# Patient Record
Sex: FEMALE | Race: BLACK OR AFRICAN AMERICAN | Employment: UNEMPLOYED | URBAN - METROPOLITAN AREA
[De-identification: names, ages, dates, MRNs, and addresses within clinical notes are randomized per-mention and may not be internally consistent; named-entity substitution may affect disease eponyms.]

---

## 2017-08-22 ENCOUNTER — OFFICE VISIT (OUTPATIENT)
Dept: INTERNAL MEDICINE CLINIC | Age: 22
End: 2017-08-22

## 2017-08-22 VITALS
HEIGHT: 62 IN | DIASTOLIC BLOOD PRESSURE: 78 MMHG | OXYGEN SATURATION: 95 % | HEART RATE: 104 BPM | SYSTOLIC BLOOD PRESSURE: 110 MMHG | BODY MASS INDEX: 20.76 KG/M2 | TEMPERATURE: 98.5 F | WEIGHT: 112.8 LBS

## 2017-08-22 DIAGNOSIS — M25.40 JOINT SWELLING: ICD-10-CM

## 2017-08-22 DIAGNOSIS — Z87.39 HISTORY OF JOINT PAIN: ICD-10-CM

## 2017-08-22 DIAGNOSIS — M54.2 NECK PAIN: Primary | ICD-10-CM

## 2017-08-22 DIAGNOSIS — G89.29 CHRONIC JAW PAIN: ICD-10-CM

## 2017-08-22 DIAGNOSIS — R68.84 CHRONIC JAW PAIN: ICD-10-CM

## 2017-08-22 RX ORDER — NORETHINDRONE ACETATE AND ETHINYL ESTRADIOL 1MG-20(24)
KIT ORAL
Refills: 4 | Status: ON HOLD | COMMUNITY
Start: 2017-07-05 | End: 2021-08-02

## 2017-08-22 RX ORDER — CYCLOBENZAPRINE HCL 10 MG
10 TABLET ORAL
Qty: 15 TAB | Refills: 0 | Status: SHIPPED | OUTPATIENT
Start: 2017-08-22 | End: 2017-09-06

## 2017-08-22 RX ORDER — IBUPROFEN 200 MG
TABLET ORAL
COMMUNITY
End: 2021-03-15
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
## 2017-08-23 LAB
25(OH)D3+25(OH)D2 SERPL-MCNC: 39.8 NG/ML (ref 30–100)
ALBUMIN SERPL-MCNC: 4.8 G/DL (ref 3.5–5.5)
ALBUMIN/GLOB SERPL: 1.7 {RATIO} (ref 1.2–2.2)
ALP SERPL-CCNC: 49 IU/L (ref 39–117)
ALT SERPL-CCNC: 10 IU/L (ref 0–32)
ANA SER QL: POSITIVE
AST SERPL-CCNC: 11 IU/L (ref 0–40)
BILIRUB SERPL-MCNC: 0.2 MG/DL (ref 0–1.2)
BUN SERPL-MCNC: 10 MG/DL (ref 6–20)
BUN/CREAT SERPL: 17 (ref 9–23)
CALCIUM SERPL-MCNC: 10.2 MG/DL (ref 8.7–10.2)
CCP IGA+IGG SERPL IA-ACNC: 7 UNITS (ref 0–19)
CHLORIDE SERPL-SCNC: 99 MMOL/L (ref 96–106)
CO2 SERPL-SCNC: 25 MMOL/L (ref 18–29)
CREAT SERPL-MCNC: 0.59 MG/DL (ref 0.57–1)
DSDNA AB SER-ACNC: 50 IU/ML (ref 0–9)
ENA RNP AB SER-ACNC: 0.3 AI (ref 0–0.9)
ENA SM AB SER-ACNC: <0.2 AI (ref 0–0.9)
ENA SS-A AB SER-ACNC: <0.2 AI (ref 0–0.9)
ENA SS-B AB SER-ACNC: <0.2 AI (ref 0–0.9)
ERYTHROCYTE [DISTWIDTH] IN BLOOD BY AUTOMATED COUNT: 14 % (ref 12.3–15.4)
GLOBULIN SER CALC-MCNC: 2.9 G/DL (ref 1.5–4.5)
GLUCOSE SERPL-MCNC: 75 MG/DL (ref 65–99)
HCT VFR BLD AUTO: 39 % (ref 34–46.6)
HGB BLD-MCNC: 12.5 G/DL (ref 11.1–15.9)
MCH RBC QN AUTO: 27.7 PG (ref 26.6–33)
MCHC RBC AUTO-ENTMCNC: 32.1 G/DL (ref 31.5–35.7)
MCV RBC AUTO: 86 FL (ref 79–97)
PLATELET # BLD AUTO: 332 X10E3/UL (ref 150–379)
POTASSIUM SERPL-SCNC: 4.6 MMOL/L (ref 3.5–5.2)
PROT SERPL-MCNC: 7.7 G/DL (ref 6–8.5)
RBC # BLD AUTO: 4.52 X10E6/UL (ref 3.77–5.28)
RHEUMATOID FACT SERPL-ACNC: <10 IU/ML (ref 0–13.9)
SEE BELOW, 164869: ABNORMAL
SODIUM SERPL-SCNC: 138 MMOL/L (ref 134–144)
TSH SERPL DL<=0.005 MIU/L-ACNC: 1 UIU/ML (ref 0.45–4.5)
WBC # BLD AUTO: 4.2 X10E3/UL (ref 3.4–10.8)

## 2017-08-29 ENCOUNTER — OFFICE VISIT (OUTPATIENT)
Dept: INTERNAL MEDICINE CLINIC | Age: 22
End: 2017-08-29

## 2017-08-29 VITALS
HEART RATE: 98 BPM | OXYGEN SATURATION: 98 % | WEIGHT: 115.2 LBS | TEMPERATURE: 99 F | HEIGHT: 62 IN | BODY MASS INDEX: 21.2 KG/M2 | DIASTOLIC BLOOD PRESSURE: 70 MMHG | RESPIRATION RATE: 14 BRPM | SYSTOLIC BLOOD PRESSURE: 118 MMHG

## 2017-08-29 DIAGNOSIS — R76.0 LUPUS ANTICOAGULANT POSITIVE: Primary | ICD-10-CM

## 2017-08-29 NOTE — MR AVS SNAPSHOT
Visit Information Date & Time Provider Department Dept. Phone Encounter #  
 8/29/2017  2:30 PM Mike Recinos MD [facility] Internal Medicine [phone] [id number] Upcoming Health Maintenance Date Due Hepatitis A Peds Age 1-18 (1 of 2 - Standard Series) 10/28/1997 DTaP/Tdap/Td series (1 - Tdap) 10/28/2003 HPV AGE 9Y-26Y (1 of 3 - Female 3 Dose Series) 10/28/2007 INFLUENZA AGE 9 TO ADULT 8/1/2017 Allergies as of 8/29/2017  Review Complete On: 8/29/2017 By: Rosalinda Loving LPN Severity Noted Reaction Type Reactions Pcn [Penicillins] High 08/22/2017    Hives Current Immunizations  Never Reviewed No immunizations on file. Not reviewed this visit You Were Diagnosed With   
  
 Codes Comments Lupus anticoagulant positive    -  Primary ICD-10-CM: R76.0 ICD-9-CM: 795.79 Vitals BP Pulse Temp Resp Height(growth percentile) Weight(growth percentile) 118/70 (BP 1 Location: Right arm, BP Patient Position: Sitting) 98 99 °F (37.2 °C) (Oral) 14 5' 1.5\" (1.562 m) 115 lb 3.2 oz (52.3 kg) LMP SpO2 BMI OB Status Smoking Status 06/24/2017 98% 21.41 kg/m2 Medically Induced Never Smoker BMI and BSA Data Body Mass Index Body Surface Area  
 21.41 kg/m 2 1.51 m 2 Preferred Pharmacy Pharmacy Name Phone [id number] [address] [facility] [phone] Your Updated Medication List  
  
   
This list is accurate as of: 8/29/17  3:31 PM.  Always use your most recent med list.  
  
  
  
  
 BLISOVI 24 FE 1 mg-20 mcg (24)/75 mg (4) Tab Generic drug:  norethindrone-e estradiol-iron TAKE 2 TABLETS BY MOUTH THE 1ST WEEK, THEN 1 TABLET BY MOUTH EVERY DAY FOR BLEEDING  
  
 cyclobenzaprine 10 mg tablet Commonly known as:  FLEXERIL Take 1 Tab by mouth nightly for 15 days. MOTRIN  mg tablet Generic drug:  ibuprofen Take  by mouth.   
  
 TYLENOL EXTRA STRENGTH PO  
 Take  by mouth. We Performed the Following REFERRAL TO RHEUMATOLOGY [VPI15 Custom] Comments:  
 Please evaluate patient for positive Lupus Ab. Referral Information Referral ID Referred By Referred To  
  
 [id number] HABIB, Lonney Phoenix, MD   
   [id number] [facility], [address] [place] [address] Road Phone: [phone] Fax: [phone] Visits Status Start Date End Date 1 New Request 8/29/17 8/29/18 If your referral has a status of pending review or denied, additional information will be sent to support the outcome of this decision. Introducing [facility]! Dear Enrrique: Thank you for requesting a [employer] account. Our records indicate that you already have an active [employer] account. You can access your account anytime at [email or web address] Did you know that you can access your hospital and ER discharge instructions at any time in [employer]? You can also review all of your test results from your hospital stay or ER visit. Additional Information If you have questions, please visit the Frequently Asked Questions section of the [employer] website at [email or web address] Remember, [employer] is NOT to be used for urgent needs. For medical emergencies, dial 911. Now available from your iPhone and Android! Please provide this summary of care documentation to your next provider. Your primary care clinician is listed as Estrella Jackman. If you have any questions after today's visit, please call [phone].

## 2017-08-29 NOTE — PROGRESS NOTES
Patient is here for lab follow up. Visit Vitals    /70 (BP 1 Location: Right arm, BP Patient Position: Sitting)    Pulse 98    Temp 99 °F (37.2 °C) (Oral)    Resp 14    Ht 5' 1.5\" (1.562 m)    Wt 115 lb 3.2 oz (52.3 kg)    SpO2 98%    BMI 21.41 kg/m2     1. Have you been to the ER, urgent care clinic since your last visit? Hospitalized since your last visit? Yes. In June [address]. Seen for jaw pain. Pt states that jaw locked up and could not open mouth. 2. Have you seen or consulted any other health care providers outside of the [address] [place] [address] since your last visit? Include any pap smears or colon screening.  No

## 2017-08-29 NOTE — PROGRESS NOTES
Written by Vicki Birch, as dictated by Dr. Cristo Her MD.    Sammie Blandon is a 22 y.o. female. HPI  The patient comes in today to discuss labs, drawn on 08/22. Autoimmune tests came back positive for lupus. RA came back negative. She has been feeling tired lately but no rash or joint swelling. Current Outpatient Prescriptions on File Prior to Visit   Medication Sig Dispense Refill    BLISOVI 24 FE 1 mg-20 mcg (24)/75 mg (4) tab TAKE 2 TABLETS BY MOUTH THE 1ST WEEK, THEN 1 TABLET BY MOUTH EVERY DAY FOR BLEEDING  4    cyclobenzaprine (FLEXERIL) 10 mg tablet Take 1 Tab by mouth nightly for 15 days. 15 Tab 0    ACETAMINOPHEN (TYLENOL EXTRA STRENGTH PO) Take  by mouth.  ibuprofen (MOTRIN IB) 200 mg tablet Take  by mouth. No current facility-administered medications on file prior to visit. Allergies   Allergen Reactions    Pcn [Penicillins] Hives       Past Medical History:   Diagnosis Date    Chronic pain     Headache     Migraines     Patella-femoral syndrome        History reviewed. No pertinent surgical history. History reviewed. No pertinent family history. Social History     Social History    Marital status: SINGLE     Spouse name: N/A    Number of children: N/A    Years of education: N/A     Occupational History    Not on file.      Social History Main Topics    Smoking status: Never Smoker    Smokeless tobacco: Never Used    Alcohol use No    Drug use: No    Sexual activity: No     Other Topics Concern    Not on file     Social History Narrative       Office Visit on 08/22/2017   Component Date Value Ref Range Status    Rheumatoid factor 08/22/2017 <10.0  0.0 - 13.9 IU/mL Final    CCP Antibodies IgG/IgA 08/22/2017 7  0 - 19 units Final    Comment:                           Negative               <20                            Weak positive      20 - 39                            Moderate positive  40 - 59 Strong positive        >59      Glucose 08/22/2017 75  65 - 99 mg/dL Final    BUN 08/22/2017 10  6 - 20 mg/dL Final    Creatinine 08/22/2017 0.59  0.57 - 1.00 mg/dL Final    GFR est non-AA 08/22/2017 132  >59 mL/min/1.73 Final    GFR est AA 08/22/2017 153  >59 mL/min/1.73 Final    BUN/Creatinine ratio 08/22/2017 17  9 - 23 Final    Sodium 08/22/2017 138  134 - 144 mmol/L Final    Potassium 08/22/2017 4.6  3.5 - 5.2 mmol/L Final    Chloride 08/22/2017 99  96 - 106 mmol/L Final    CO2 08/22/2017 25  18 - 29 mmol/L Final    Calcium 08/22/2017 10.2  8.7 - 10.2 mg/dL Final    Protein, total 08/22/2017 7.7  6.0 - 8.5 g/dL Final    Albumin 08/22/2017 4.8  3.5 - 5.5 g/dL Final    GLOBULIN, TOTAL 08/22/2017 2.9  1.5 - 4.5 g/dL Final    A-G Ratio 08/22/2017 1.7  1.2 - 2.2 Final    Bilirubin, total 08/22/2017 0.2  0.0 - 1.2 mg/dL Final    Alk. phosphatase 08/22/2017 49  39 - 117 IU/L Final    AST (SGOT) 08/22/2017 11  0 - 40 IU/L Final    ALT (SGPT) 08/22/2017 10  0 - 32 IU/L Final    TSH 08/22/2017 0.996  0.450 - 4.500 uIU/mL Final    WBC 08/22/2017 4.2  3.4 - 10.8 x10E3/uL Final    RBC 08/22/2017 4.52  3.77 - 5.28 x10E6/uL Final    HGB 08/22/2017 12.5  11.1 - 15.9 g/dL Final    HCT 08/22/2017 39.0  34.0 - 46.6 % Final    MCV 08/22/2017 86  79 - 97 fL Final    MCH 08/22/2017 27.7  26.6 - 33.0 pg Final    MCHC 08/22/2017 32.1  31.5 - 35.7 g/dL Final    RDW 08/22/2017 14.0  12.3 - 15.4 % Final    PLATELET 67/39/7099 319  150 - 379 x10E3/uL Final    VITAMIN D, 25-HYDROXY 08/22/2017 39.8  30.0 - 100.0 ng/mL Final    Comment: Vitamin D deficiency has been defined by the [facility] practice guideline as a  level of serum 25-OH vitamin D less than 20 ng/mL (1,2). The Endocrine Society went on to further define vitamin D  insufficiency as a level between 21 and 29 ng/mL (2). 1. IOM ([place] of [employer]). 2010.  Dietary reference     intakes for calcium and D. [facility]: The     [employer]. 2. Mary MF, Libra NC, Demetrio GARDNER, et al.     Evaluation, treatment, and prevention of vitamin D     deficiency: an Endocrine Society clinical practice     guideline. JCEM. 2011; 96(4):1911-30.       Antinuclear Antibodies Direct 2017 Positive* Negative Final    Anti-DNA (DS) Ab, QT 2017 50* 0 - 9 IU/mL Final    Comment:                                    Negative      <5                                     Equivocal  5 - 9                                     Positive      >9      RNP Abs 2017 0.3  0.0 - 0.9 AI Final    Smith Abs 2017 <0.2  0.0 - 0.9 AI Final    Sjogren's Anti-SS-A 2017 <0.2  0.0 - 0.9 AI Final    Sjogren's Anti-SS-B 2017 <0.2  0.0 - 0.9 AI Final    See below 2017 Comment   Final    Comment: Autoantibody                       Disease Association  ------------------------------------------------------------                          Condition                  Frequency  ---------------------   ------------------------   ---------  Antinuclear Antibody,    SLE, mixed connective  Direct (ARAVIND-D)           tissue diseases  ---------------------   ------------------------   ---------  dsDNA                    SLE                        40 - 60%  ---------------------   ------------------------   ---------  Chromatin                Drug induced SLE                90%                           SLE                        48 - 97%  ---------------------   ------------------------   ---------  SSA (Ro)                 SLE                        25 - 35%                           Sjogren's Syndrome         40 - 70%                            Lupus                 100%  ---------------------   ------------------------   ---------  SSB (La)                 SLE                                                        10%                           Sjogren's Syndrome 30%  ---------------------   -----------------------    ---------  Sm (anti-Smith)          SLE                        15 - 30%  ---------------------   -----------------------    ---------  RNP                      Mixed Connective Tissue                           Disease                         95%  (U1 nRNP,                SLE                        30 - 50%  anti-ribonucleoprotein)  Polymyositis and/or                           Dermatomyositis                 20%  ---------------------   ------------------------   ---------  Scl-70 (antiDNA          Scleroderma (diffuse)      20 - 35%  topoisomerase)           Crest                           13%  ---------------------   ------------------------   ---------  Miley-1                     Polymyositis and/or                           Dermatomyositis            20 - 40%  ---------------------   ------------------------   ---------  Centromere B             Scleroderma -                            Crest                           variant                         80%         Review of Systems   Constitutional: Negative for malaise/fatigue. HENT: Negative for congestion. Respiratory: Negative for cough and shortness of breath. Musculoskeletal: Positive for joint pain. Negative for myalgias. Neurological: Negative for weakness. Visit Vitals    /70 (BP 1 Location: Right arm, BP Patient Position: Sitting)    Pulse 98    Temp 99 °F (37.2 °C) (Oral)    Resp 14    Ht 5' 1.5\" (1.562 m)    Wt 115 lb 3.2 oz (52.3 kg)    LMP 06/24/2017    SpO2 98%    BMI 21.41 kg/m2       Physical Exam   Constitutional: She is oriented to person, place, and time. She appears well-developed and well-nourished. No distress. HENT:   Right Ear: External ear normal.   Left Ear: External ear normal.   Eyes: Conjunctivae and EOM are normal. Right eye exhibits no discharge. Left eye exhibits no discharge. Neck: Normal range of motion. Neck supple.    Cardiovascular: Normal rate and regular rhythm. Pulmonary/Chest: Effort normal and breath sounds normal. She has no wheezes. Abdominal: Soft. Bowel sounds are normal. There is no tenderness. Lymphadenopathy:     She has no cervical adenopathy. Neurological: She is alert and oriented to person, place, and time. Skin: She is not diaphoretic. Psychiatric: She has a normal mood and affect. Her behavior is normal.   Nursing note and vitals reviewed. ASSESSMENT and PLAN    ICD-10-CM ICD-9-CM    1. Lupus anticoagulant positive R76.0 795.79 REFERRAL TO RHEUMATOLOGY. Labs results reviewed with her. Referral to rheumatology placed. This plan was reviewed with the patient and patient agrees. All questions were answered. This scribe documentation was reviewed by me and accurately reflects the examination and decisions made by me. This note will not be viewable in [address]

## 2019-02-26 ENCOUNTER — HOSPITAL ENCOUNTER (OUTPATIENT)
Dept: GENERAL RADIOLOGY | Age: 24
Discharge: HOME | End: 2019-02-26
Payer: COMMERCIAL

## 2019-02-26 DIAGNOSIS — M32.9 SYSTEMIC LUPUS ERYTHEMATOSUS (HCC): ICD-10-CM

## 2019-02-26 DIAGNOSIS — Z82.61 FAMILY HISTORY OF ARTHRITIS: ICD-10-CM

## 2019-02-26 PROCEDURE — 73030 X-RAY EXAM OF SHOULDER: CPT

## 2019-02-26 PROCEDURE — 73630 X-RAY EXAM OF FOOT: CPT

## 2019-02-26 PROCEDURE — 73130 X-RAY EXAM OF HAND: CPT

## 2019-02-26 PROCEDURE — 73610 X-RAY EXAM OF ANKLE: CPT

## 2021-01-07 ENCOUNTER — OFFICE VISIT (OUTPATIENT)
Dept: PRIMARY CARE CLINIC | Age: 26
End: 2021-01-07
Payer: MEDICAID

## 2021-01-07 VITALS
HEART RATE: 93 BPM | RESPIRATION RATE: 16 BRPM | TEMPERATURE: 97.3 F | DIASTOLIC BLOOD PRESSURE: 77 MMHG | OXYGEN SATURATION: 95 % | BODY MASS INDEX: 37.61 KG/M2 | HEIGHT: 61 IN | SYSTOLIC BLOOD PRESSURE: 124 MMHG | WEIGHT: 199.2 LBS

## 2021-01-07 DIAGNOSIS — F32.4 MAJOR DEPRESSIVE DISORDER IN PARTIAL REMISSION, UNSPECIFIED WHETHER RECURRENT (HCC): ICD-10-CM

## 2021-01-07 DIAGNOSIS — Z76.89 ESTABLISHING CARE WITH NEW DOCTOR, ENCOUNTER FOR: ICD-10-CM

## 2021-01-07 DIAGNOSIS — F41.1 GAD (GENERALIZED ANXIETY DISORDER): ICD-10-CM

## 2021-01-07 DIAGNOSIS — R63.5 WEIGHT GAIN: ICD-10-CM

## 2021-01-07 DIAGNOSIS — L08.9 TOE INFECTION: ICD-10-CM

## 2021-01-07 DIAGNOSIS — Z28.21 REFUSED INFLUENZA VACCINE: ICD-10-CM

## 2021-01-07 DIAGNOSIS — M32.9 LUPUS (HCC): Primary | ICD-10-CM

## 2021-01-07 PROBLEM — Z86.59 HISTORY OF EATING DISORDER: Status: ACTIVE | Noted: 2021-01-07

## 2021-01-07 PROBLEM — F32.A DEPRESSION: Status: ACTIVE | Noted: 2021-01-07

## 2021-01-07 PROCEDURE — 99203 OFFICE O/P NEW LOW 30 MIN: CPT | Performed by: FAMILY MEDICINE

## 2021-01-07 RX ORDER — DICYCLOMINE HYDROCHLORIDE 10 MG/1
10 CAPSULE ORAL
COMMUNITY
Start: 2020-11-20 | End: 2021-08-04

## 2021-01-07 RX ORDER — LORAZEPAM 0.5 MG/1
TABLET ORAL
Status: ON HOLD | COMMUNITY
Start: 2020-11-20 | End: 2021-08-02

## 2021-01-07 RX ORDER — MIRTAZAPINE 15 MG/1
15 TABLET, FILM COATED ORAL
COMMUNITY
Start: 2020-12-27 | End: 2021-07-13 | Stop reason: SDUPTHER

## 2021-01-07 RX ORDER — PREDNISONE 5 MG/1
TABLET ORAL SEE ADMIN INSTRUCTIONS
COMMUNITY
End: 2021-03-15 | Stop reason: ALTCHOICE

## 2021-01-07 RX ORDER — FLUOXETINE HYDROCHLORIDE 40 MG/1
40 CAPSULE ORAL DAILY
COMMUNITY
Start: 2020-12-27 | End: 2021-07-13 | Stop reason: SDUPTHER

## 2021-01-07 NOTE — PROGRESS NOTES
Chief Complaint   Patient presents with   [facility] Establish Care    Neck Pain     base of neck that radiates to right jaw [facility]    Weight Gain    Dental Problem     teeth falling out since 2013      Pap up to date    3 most recent PHQ Screens 1/7/2021   Little interest or pleasure in doing things Several days   Feeling down, depressed, irritable, or hopeless Several days   Total Score PHQ 2 2     Abuse Screening Questionnaire 1/7/2021   Do you ever feel afraid of your partner? N   Are you in a relationship with someone who physically or mentally threatens you? N   Is it safe for you to go home? Y     Visit Vitals  /77 (BP 1 Location: Left arm)   Pulse 93   Temp 97.3 °F (36.3 °C) (Oral)   Resp 16   Ht 5' 1\" (1.549 m)   Wt 199 lb 3.2 oz (90.4 kg)   SpO2 95%   BMI 37.64 kg/m²     1. Have you been to the ER, urgent care clinic since your last visit? Hospitalized since your last visit?no    2. Have you seen or consulted any other health care providers outside of the [address] [place] [address] since your last visit? Include any pap smears or colon screening.  Rheumatology

## 2021-01-07 NOTE — PATIENT INSTRUCTIONS
[address] [facility] [employer]  [address], [address] [facility] Denise Dozier, [address]  [phone]    Lupus: Care Instructions  Overview  Lupus (systemic lupus erythematosus) is a long-term disease that can cause inflammation, pain, and tissue damage in your body. It is an autoimmune disease. This means the immune system attacks its own tissues. Lupus may cause problems with your skin, kidneys, heart, lungs, nerves, or blood cells. There are other types of lupus, but systemic lupus erythematosus is the most common and most serious type. When you have lupus symptoms, you are having flares or relapses. When your symptoms get better, you are in remission. Lupus may get worse very quickly. There is no way to tell when a flare will happen or how bad it will be. When you have a lupus flare, you may have new symptoms as well as symptoms you have had in the past.  Learn your body's signs of a flare, such as joint pain, a rash, a fever, or being more tired. When you see any of these signs, take steps to control your symptoms. Follow-up care is a key part of your treatment and safety. Be sure to make and go to all appointments, and call your doctor if you are having problems. It's also a good idea to know your test results and keep a list of the medicines you take. How can you care for yourself at home? Reduce stress and tiredness  · Keep your daily schedule as simple as possible. · Keep your list of things to do as short as you can. · Exercise regularly. A daily walk or swim, for example, can lower stress, clear your head, improve your mood, and help fight tiredness. · Use meditation, yoga, or guided imagery to relax. · Get plenty of rest. Some people with lupus need up to 12 hours of sleep every night. · Pace yourself. Do not do too many activities. · Ask others for help. Do not try to do everything yourself. · Take short breaks from your usual activities.  Think about cutting down on work hours when your symptoms are severe. · If you think that depression or anxiety is making you feel more tired, talk to your doctor, a mental health professional, or both. Take care of your skin  · Ask your doctor about the use of corticosteroid creams for skin symptoms. · If you are bothered by the way a lupus rash looks on your face or if you have scars from lupus, you can try makeup, such as Covermark, to cover the rash or scars. · Stay out of the sun, especially when the sun's rays are the strongest, usually between 10 a.m. and 4 p.m. If you must be in the sun, cover your arms and legs, and wear a hat. Make sure to use a broad-spectrum sunscreen that has a sun protection factor (SPF) of 50 or higher. Put more sunscreen on after swimming, sweating, or toweling off. Practice good self-care  · Learn more about lupus and how to take care of yourself. · Take your medicines exactly as prescribed. Call your doctor if you have any problems with your medicine. · Do not smoke. If you need help quitting, talk to your doctor about stop-smoking programs and medicines. These can increase your chances of quitting for good. · Eat a healthy, balanced diet. A balanced diet includes whole grains, dairy, fruits and vegetables, and protein. Eat a variety of foods from each of those groups so you get all the nutrients you need. · Avoid other people who are sick with colds or the flu. These illnesses can cause lupus flares. Talk to your doctor about flu shots and pneumococcal vaccinations. If you do get sick or think you are getting an infection, talk with your doctor so you can treat your symptoms right away. · Brush and floss your teeth each day. See your dentist two times a year. · Get regular eye exams. · Build a support system of family, friends, and health professionals. When should you call for help? Call 911 anytime you think you may need emergency care.  For example, call if:    · You have symptoms of a heart attack. These may include:  ? Chest pain or pressure, or a strange feeling in the chest.  ? Sweating. ? Shortness of breath. ? Nausea or vomiting. ? Pain, pressure, or a strange feeling in the back, neck, jaw, or upper belly or in one or both shoulders or arms. ? Lightheadedness or sudden weakness. ? A fast or irregular heartbeat. After you call 911, the  may tell you to chew 1 adult-strength or 2 to 4 low-dose aspirin. Wait for an ambulance. Do not try to drive yourself. Call your doctor now or seek immediate medical care if:    · You are short of breath.     · You have blood in your urine or are urinating less often and in smaller amounts than usual.     · You have a fever.     · You feel depressed or notice any changes in your behavior or thinking.     · You are dizzy or have muscle weakness.     · You have swelling of the lower legs or feet. Watch closely for changes in your health, and be sure to contact your doctor if:    · Your symptoms get worse or you develop any new symptoms. These may include aching or swollen joints, increased fatigue, loss of appetite, hair loss, skin rashes, or new sores in your mouth or nose. Where can you learn more? Go to http://www.gray.com/  Enter H871 in the search box to learn more about \"Lupus: Care Instructions. \"  Current as of: December 9, 2019               Content Version: 12.6  © 9966-6450 [employer]. Care instructions adapted under license by [employer] (which disclaims liability or warranty for this information).  If you have questions about a medical condition or this instruction, always ask your healthcare professional. [place] [address] any warranty or liability for your use of this information.

## 2021-01-07 NOTE — PROGRESS NOTES
HPI     Chief Complaint   Patient presents with   [facility]    Neck Pain     base of neck that radiates to right jaw [facility]    Weight Gain    Dental Problem     teeth falling out since 2013     She is a 26 y.o. female who presents to [facility]. Establishing Care  - Chronic medical problems: lupus, depression, anxiety, possible endometriosis, headaches, floating knee cap syndrome  - Family history: Dad has floating knee cap syndrome, Mom and Brother both have chronic pain, brother has spondyloarthropathy and Mom has osteoarthritis  - Surgical history: had to have foreign body removed at 13 YO (quarter)  - Social history (sexually active, occupation, smoker, etoh use, etc): lives with Mom and brother, not currently working, not sexually active, never been a smoker, social alcohol use    Has multiple concerns today. Was diagnosed with lupus a few years ago. Followed by Dr. Cassius Mattson with Rheum. States she briefly tried Plaquenil but states she could not tolerate it. Has been on systemic Prednisone a few times but never long  States she just started steroids again 2 days ago for neck and knee pain. Is planning on seeing Ortho for these problems in a few days. Notes she has received multiple joint injections. She states her teeth are starting to fall out. She is followed by a dentist for this and they state it is due to her lupus. She has a history of oral ulcers, multiple joint effusions/ pains. She had a positive ARAVIND and DsDNA lab in 2017. States briefly after she was diagnosed she also had anemia. She is concerned that she has been misdiagnosed or has something else going on. Interested in a second opinion. She has a history of depression, anxiety and eating disorder. She was put on Remeron and Xanax to help her gain weight. She states ever since she went on the Xanax she has continued to gain weight. She needs a new referral to Psych since getting Medicaid.  States her depression/ SHALINI is well controlled. Denies SI/ HI. States she has been on birth control for possible endometriosis. States Rheum is aware she was started on the birth control. Has no history of blood clots. States she has had recurrent toe infections on her L great toe. States the skin sometimes peels. No current infection. Notes about 2 weeks ago she woke up and had a black spot under her L great toe toenail. Does not recall any trauma. States the spot has turned lighter in color over the past 2 weeks. Review of Systems  Denies fever, chills, chest pain, shortness of breath, abd pain, nausea, vomiting    Reviewed PmHx, RxHx, FmHx, SocHx, AllgHx and updated and dated in the chart. Physical Exam:  Visit Vitals  /77 (BP 1 Location: Left arm)   Pulse 93   Temp 97.3 °F (36.3 °C) (Oral)   Resp 16   Ht 5' 1\" (1.549 m)   Wt 199 lb 3.2 oz (90.4 kg)   SpO2 95%   BMI 37.64 kg/m²     Physical Exam  Vitals signs and nursing note reviewed. Constitutional:       General: She is not in acute distress. Appearance: Normal appearance. She is not ill-appearing. Cardiovascular:      Rate and Rhythm: Normal rate and regular rhythm. Heart sounds: No murmur. Pulmonary:      Effort: Pulmonary effort is normal. No respiratory distress. Breath sounds: Normal breath sounds. Skin:     Capillary Refill: Capillary refill takes less than 2 seconds. Comments: L great toe shows what appears to be hematoma under the nail bed. No tenderness to palpation. The region does not extend the entire length of the nail bed and is circular in appearance. There are no active signs of infection including erythema, warmth, or tenderness to palpation of the L great toe. Neurological:      Mental Status: She is alert. No results found for this or any previous visit (from the past 12 hour(s)). Assessment / Plan     Diagnoses and all orders for this visit:    1.  Lupus ([address] - Briefly discussed with patient it appears she meets at least 4 of the [place] criteria (3 clinical and 2 immunological criteria) at some point in time. Discussed I would be happy to refer to another Rheumatologist for second opinion or to see if she would be a candidate for other treatment options. Referred to Dr. Indio Walters. Recommend she fill out release of records for Dr. Reatha Hatchet. Will check CBC and CMP today. -     CBC W/O DIFF; Future  -     METABOLIC PANEL, COMPREHENSIVE; Future  -     REFERRAL TO RHEUMATOLOGY    2. Weight gain - Patient states she has continuously gained weight since she was placed on Xanax and Remeron for history of eating disorder. She is followed by Psych. Will check LDL, A1c and TSH today. -     LDL, DIRECT; Future  -     HEMOGLOBIN A1C WITH EAG; Future  -     TSH 3RD GENERATION; Future    3. Toe infection - No active infection. Hx of recurrent infections. Appears she has a subungual hematoma and history of it already improving/ lightening in color is consistent with this. Will refer to Podiatry to see if this can be drained or if the nail needs to be trimmed back to prevent future toe infections. Discussed if the hematoma does not appear to be continuing to improve over the next few weeks to please let me know. -     REFERRAL TO PODIATRY; Future    4. SHALINI (generalized anxiety disorder) - Referred to Psych.  -     REFERRAL TO PSYCHIATRY    5. Major depressive disorder in partial remission, unspecified whether recurrent ([address] - Referred to Psych.  -     REFERRAL TO PSYCHIATRY    6. Establishing care with new doctor, encounter for    7. Refused influenza vaccine    I have discussed the diagnosis with the patient and the intended plan as seen in the above orders. The patient has received an after-visit summary and questions were answered concerning future plans. I have discussed medication side effects and warnings with the patient as well.     Sheldon Orourke, DO

## 2021-01-18 ENCOUNTER — TELEPHONE (OUTPATIENT)
Dept: PRIMARY CARE CLINIC | Age: 26
End: 2021-01-18

## 2021-01-18 DIAGNOSIS — R73.03 PREDIABETES: Primary | ICD-10-CM

## 2021-01-18 DIAGNOSIS — E78.5 HYPERLIPIDEMIA, UNSPECIFIED HYPERLIPIDEMIA TYPE: ICD-10-CM

## 2021-01-18 NOTE — TELEPHONE ENCOUNTER
Answered questions about nutrition. She is interested in referral to Nutritionist. Will provide referral. Instructed to call back of insurance card to see who is in network.

## 2021-02-18 ENCOUNTER — VIRTUAL VISIT (OUTPATIENT)
Dept: PRIMARY CARE CLINIC | Age: 26
End: 2021-02-18
Payer: COMMERCIAL

## 2021-02-18 DIAGNOSIS — L30.0 NUMMULAR ECZEMA: Primary | ICD-10-CM

## 2021-02-18 PROCEDURE — 99213 OFFICE O/P EST LOW 20 MIN: CPT | Performed by: FAMILY MEDICINE

## 2021-02-18 RX ORDER — HYDROCORTISONE 25 MG/G
CREAM TOPICAL 2 TIMES DAILY
Qty: 30 G | Refills: 0 | Status: SHIPPED | OUTPATIENT
Start: 2021-02-18 | End: 2021-03-15 | Stop reason: ALTCHOICE

## 2021-02-18 NOTE — PATIENT INSTRUCTIONS
Atopic Dermatitis: Care Instructions Your Care Instructions Atopic dermatitis (also called eczema) is a skin problem that causes intense itching and a red, raised rash. In severe cases, the rash develops clear fluidfilled blisters. The rash is not contagious. People with this condition seem to have very sensitive immune systems that are likely to react to things that cause allergies. The immune system is the body's way of fighting infection. There is no cure for atopic dermatitis, but you may be able to control it with care at home. Follow-up care is a key part of your treatment and safety. Be sure to make and go to all appointments, and call your doctor if you are having problems. It's also a good idea to know your test results and keep a list of the medicines you take. How can you care for yourself at home? · Use moisturizer at least twice a day. · If your doctor prescribes a cream, use it as directed. If your doctor prescribes other medicine, take it exactly as directed. · Wash the affected area with water only. Soap can make dryness and itching worse. Pat dry. · Apply a moisturizer after bathing. Use a cream such as Lubriderm, Moisturel, or Cetaphil that does not irritate the skin or cause a rash. Apply the cream while your skin is still damp after lightly drying with a towel. · Use cold, wet cloths to reduce itching. · Keep cool, and stay out of the sun. · If itching affects your normal activities, an over-the-counter antihistamine, such as diphenhydramine (Benadryl) or loratadine (Claritin) may help. Read and follow all instructions on the label. When should you call for help? Call your doctor now or seek immediate medical care if: 
  · Your rash gets worse and you have a fever.  
  · You have new blisters or bruises, or the rash spreads and looks like a sunburn.  
  · You have signs of infection, such as: 
? Increased pain, swelling, warmth, or redness. ? Red streaks leading from the rash. ? Pus draining from the rash. ? A fever.  
  · You have crusting or oozing sores.  
  · You have joint aches or body aches along with your rash. Watch closely for changes in your health, and be sure to contact your doctor if: 
  · Your rash does not clear up after 2 to 3 weeks of home treatment.  
  · Itching interferes with your sleep or daily activities. Where can you learn more? Go to http://www.gray.com/ Enter P633 in the search box to learn more about \"Atopic Dermatitis: Care Instructions. \" Current as of: July 2, 2020               Content Version: 12.6 © 3778-5782 [employer]. Care instructions adapted under license by [employer] (which disclaims liability or warranty for this information).  If you have questions about a medical condition or this instruction, always ask your healthcare professional. [address] any warranty or liability for your use of this information.

## 2021-02-18 NOTE — PROGRESS NOTES
Katty Cintron [address] [place] [address]  89 y.o. female  1995  [address]  [phone]   [facility]   Telemedicine Progress Note  Jordan Wong DO       Encounter Date and Time: February 18, 2021 at 11:38 AM    Consent: Shubham Garcia, who was seen by synchronous (real-time) audio-video technology, and/or her healthcare decision maker, is aware that this patient-initiated, Telehealth encounter on 2/18/2021 is a billable service, with coverage as determined by her insurance carrier. She is aware that she may receive a bill and has provided verbal consent to proceed: Yes. No chief complaint on file. History of Present Illness   Shubham Garcia is a 26 y.o. female was evaluated by synchronous (real-time) audio-video technology from home, through a secure patient portal [employer]. PMH significant for lupus, SHALINI, depression. States she has had a skin rash for a few weeks on her L posterior aspect of wrist. States she does not have the rash anywhere else. States she has tried multiple ointments and creams but has not noticed significant improvement. Tried a family members steroid cream for 1 week that they had at home but notes it was prescribed for something on the face and was at least 3years old. The rash is circular in size, dry and itchy. States she does not wear a wrist watch or jewelery, has not tried new detergents or lotions. Review of Systems   Review of Systems   Constitutional: Negative for chills and fever. Skin: Positive for itching and rash.      Vitals/Objective:     General: alert, cooperative, no distress   Mental  status: mental status: alert, oriented to person, place, and time, normal mood, behavior, speech, dress, motor activity, and thought processes   Resp: resp: normal effort and no respiratory distress   Neuro: neuro: no gross deficits   Skin: 1.5x1.5 cm erythematous dry circular patch on extensor surface of L wrist   Due to this being a TeleHealth evaluation, many elements of the physical examination are unable to be assessed. Assessment and Plan:   Time-based coding, delete if not needed: I spent at least 10 minutes with this established patient, and >50% of the time was spent counseling and/or coordinating care regarding nummular eczema    1. Nummular eczema  Discussed conservative measures including luke warm baths, applying moisturizer when getting out with a lotion free of dyes and fragrances and contains ceramides. Sounds like steroid cream she tried may have been . Will try 2.5% Hydrocortisone BID for 1 week to see how she does. Discussed risks of steroid ointment including skin discoloration and thinning skin and should not be used on face. Instructed to send [employer] message if it is worsening on steroids or not improving. She is agreeable to plan. - hydrocortisone (HYTONE) 2.5 % topical cream; Apply  to affected area two (2) times a day. use thin layer  Dispense: 30 g; Refill: 0    Time spent in direct conversation with the patient to include medical condition(s) discussed, assessment and treatment plan: 10 min    We discussed the expected course, resolution and complications of the diagnosis(es) in detail. Medication risks, benefits, costs, interactions, and alternatives were discussed as indicated. I advised her to contact the office if her condition worsens, changes or fails to improve as anticipated. She expressed understanding with the diagnosis(es) and plan. Patient understands that this encounter was a temporary measure, and the importance of further follow up and examination was emphasized. Patient verbalized understanding. Patient informed to follow up: 1 week. Electronically Signed: Zheng Cherry DO    [facility],ws is a 26 y.o. female who was evaluated by an audio-video encounter for concerns as above. Patient identification was verified prior to start of the visit.  A caregiver was present when appropriate. Due to this being a TeleHealth encounter (During AROOM-96 public health emergency), evaluation of the following organ systems was limited: Vitals/Constitutional/EENT/Resp/CV/GI//MS/Neuro/Skin/Heme-Lymph-Imm. Pursuant to the emergency declaration under the [address] [place] [address] waiver authority and the [employer] Act, this Virtual Visit was conducted, with patient's (and/or legal guardian's) consent, to reduce the patient's risk of exposure to COVID-19 and provide necessary medical care. Services were provided through a synchronous discussion virtually to substitute for in-person clinic visit. I was in the office. The patient was at home. History   Patients past medical, surgical and family histories were reviewed and updated.       Past Medical History:   Diagnosis Date    Chronic pain     Endometriosis     Headache     Lupus ([facility] [address] 2017    Migraines     Patella-femoral syndrome      Past Surgical History:   Procedure Laterality Date    HX HEENT  2001    swallowed foreign body nickel     Family History   Problem Relation Age of Onset    Hypertension Mother    [de-identified] Arthritis-osteo Mother      Social History     Socioeconomic History    Marital status: SINGLE     Spouse name: Not on file    Number of children: Not on file    Years of education: Not on file    Highest education level: Not on file   Occupational History    Not on file   Social Needs    Financial resource strain: Not on file    Food insecurity     Worry: Not on file     Inability: Not on file    Transportation needs     Medical: Not on file     Non-medical: Not on file   Tobacco Use    Smoking status: Never Smoker    Smokeless tobacco: Never Used   Substance and Sexual Activity    Alcohol use: Yes     Comment: social    Drug use: Yes     Types: Marijuana    Sexual activity: Never   Lifestyle    Physical activity     Days per week: Not on file     Minutes per session: Not on file    Stress: Not on file   Relationships    Social connections     Talks on phone: Not on file     Gets together: Not on file     Attends Orthodoxy service: Not on file     Active member of club or organization: Not on file     Attends meetings of clubs or organizations: Not on file     Relationship status: Not on file    Intimate partner violence     Fear of current or ex partner: Not on file     Emotionally abused: Not on file     Physically abused: Not on file     Forced sexual activity: Not on file   Other Topics Concern    Not on file   Social History Narrative    Not on file     Patient Active Problem List   Diagnosis Code    Lupus ([facility] [address] M32.9    SHALINI (generalized anxiety disorder) F41.1    Depression F32.9    History of eating disorder Z86.59          Current Medications/Allergies   Medications and Allergies reviewed:    Current Outpatient Medications   Medication Sig Dispense Refill    hydrocortisone (HYTONE) 2.5 % topical cream Apply  to affected area two (2) times a day. use thin layer 30 g 0    dicyclomine (BENTYL) 10 mg capsule TAKE TWO CAPSULES BY MOUTH TWICE A DAY      FLUoxetine (PROzac) 40 mg capsule       LORazepam (ATIVAN) 0.5 mg tablet TAKE ONE TABLET BY MOUTH TWICE A DAY AS NEEDED FOR ANXIETY      mirtazapine (REMERON) 15 mg tablet       predniSONE (STERAPRED) 5 mg dose pack Take  by mouth See Admin Instructions. See administration instruction per 5mg dose pack      BLISOVI 24 FE 1 mg-20 mcg (24)/75 mg (4) tab TAKE 2 TABLETS BY MOUTH THE 1ST WEEK, THEN 1 TABLET BY MOUTH EVERY DAY FOR BLEEDING  4    ACETAMINOPHEN (TYLENOL EXTRA STRENGTH PO) Take  by mouth.  ibuprofen (MOTRIN IB) 200 mg tablet Take  by mouth.        Allergies   Allergen Reactions    Pcn [Penicillins] Hives

## 2021-03-05 ENCOUNTER — TELEPHONE (OUTPATIENT)
Dept: RHEUMATOLOGY | Age: 26
End: 2021-03-05

## 2021-03-15 ENCOUNTER — OFFICE VISIT (OUTPATIENT)
Dept: PRIMARY CARE CLINIC | Age: 26
End: 2021-03-15
Payer: MEDICARE

## 2021-03-15 VITALS
TEMPERATURE: 96.6 F | HEART RATE: 90 BPM | DIASTOLIC BLOOD PRESSURE: 78 MMHG | BODY MASS INDEX: 38.1 KG/M2 | HEIGHT: 61 IN | WEIGHT: 201.8 LBS | OXYGEN SATURATION: 100 % | SYSTOLIC BLOOD PRESSURE: 121 MMHG | RESPIRATION RATE: 18 BRPM

## 2021-03-15 DIAGNOSIS — M54.2 NECK PAIN: ICD-10-CM

## 2021-03-15 DIAGNOSIS — R42 DIZZINESS: ICD-10-CM

## 2021-03-15 DIAGNOSIS — K08.9 POOR DENTITION: ICD-10-CM

## 2021-03-15 DIAGNOSIS — Z79.1 NSAID LONG-TERM USE: ICD-10-CM

## 2021-03-15 DIAGNOSIS — R20.0 RIGHT FACIAL NUMBNESS: Primary | ICD-10-CM

## 2021-03-15 PROCEDURE — 99214 OFFICE O/P EST MOD 30 MIN: CPT | Performed by: FAMILY MEDICINE

## 2021-03-15 RX ORDER — LIDOCAINE 50 MG/G
PATCH TOPICAL
Qty: 10 EACH | Refills: 0 | Status: ON HOLD | OUTPATIENT
Start: 2021-03-15 | End: 2021-08-02

## 2021-03-15 RX ORDER — NAPROXEN 500 MG/1
500 TABLET ORAL 2 TIMES DAILY WITH MEALS
Qty: 14 TAB | Refills: 0 | Status: ON HOLD | OUTPATIENT
Start: 2021-03-15 | End: 2021-08-02

## 2021-03-15 NOTE — PROGRESS NOTES
Chief Complaint   Patient presents with    Pain (Chronic)     f/u       1. Have you been to the ER, urgent care clinic since your last visit? Hospitalized since your last visit? No    2. Have you seen or consulted any other health care providers outside of the [address] [place] [address] since your last visit? Include any pap smears or colon screening. [facility] was to go to [facility] but feels she can't do it due to pain.

## 2021-03-15 NOTE — PATIENT INSTRUCTIONS
Dizziness: Care Instructions  Your Care Instructions  Dizziness is the feeling of unsteadiness or fuzziness in your head. It is different than having vertigo, which is a feeling that the room is spinning or that you are moving or falling. It is also different from lightheadedness, which is the feeling that you are about to faint.  It can be hard to know what causes dizziness. Some people feel dizzy when they have migraine headaches. Sometimes bouts of flu can make you feel dizzy. Some medical conditions, such as heart problems or high blood pressure, can make you feel dizzy. Many medicines can cause dizziness, including medicines for high blood pressure, pain, or anxiety.  If a medicine causes your symptoms, your doctor may recommend that you stop or change the medicine. If it is a problem with your heart, you may need medicine to help your heart work better. If there is no clear reason for your symptoms, your doctor may suggest watching and waiting for a while to see if the dizziness goes away on its own.  Follow-up care is a key part of your treatment and safety. Be sure to make and go to all appointments, and call your doctor if you are having problems. It's also a good idea to know your test results and keep a list of the medicines you take.  How can you care for yourself at home?  · If your doctor recommends or prescribes medicine, take it exactly as directed. Call your doctor if you think you are having a problem with your medicine.  · Do not drive while you feel dizzy.  · Try to prevent falls. Steps you can take include:  ? Using nonskid mats, adding grab bars near the tub, and using night-lights.  ? Clearing your home so that walkways are free of anything you might trip on.  ? Letting family and friends know that you have been feeling dizzy. This will help them know how to help you.  When should you call for help?   Call 911 anytime you think you may need emergency care. For example, call if:    · You  passed out (lost consciousness).     · You have dizziness along with symptoms of a heart attack. These may include:  ? Chest pain or pressure, or a strange feeling in the chest.  ? Sweating. ? Shortness of breath. ? Nausea or vomiting. ? Pain, pressure, or a strange feeling in the back, neck, jaw, or upper belly or in one or both shoulders or arms. ? Lightheadedness or sudden weakness. ? A fast or irregular heartbeat.     · You have symptoms of a stroke. These may include:  ? Sudden numbness, tingling, weakness, or loss of movement in your face, arm, or leg, especially on only one side of your body. ? Sudden vision changes. ? Sudden trouble speaking. ? Sudden confusion or trouble understanding simple statements. ? Sudden problems with walking or balance. ? A sudden, severe headache that is different from past headaches. Call your doctor now or seek immediate medical care if:    · You feel dizzy and have a fever, headache, or ringing in your ears.     · You have new or increased nausea and vomiting.     · Your dizziness does not go away or comes back. Watch closely for changes in your health, and be sure to contact your doctor if:    · You do not get better as expected. Where can you learn more? Go to http://www.gray.com/  Enter Q823 in the search box to learn more about \"Dizziness: Care Instructions. \"  Current as of: June 26, 2019               Content Version: 12.6  © 3665-0611 [employer]. Care instructions adapted under license by [employer] (which disclaims liability or warranty for this information).  If you have questions about a medical condition or this instruction, always ask your healthcare professional. [place] [address] any warranty or liability for your use of this information.      Benign Paroxysmal Positional Vertigo (BPPV): Care Instructions  Your Care Instructions     Benign paroxysmal positional vertigo, also called BPPV, is an inner ear problem. It causes a spinning or whirling sensation when you move your head. This sensation is called vertigo. The vertigo usually lasts for less than a minute. People often have vertigo spells for a few days or weeks. Then the vertigo goes away. But it may come back again. The vertigo may be mild, or it may be bad enough to cause unsteadiness, nausea, and vomiting. When you move, your inner ear sends messages to the brain. This helps you keep your balance. Vertigo can happen when debris builds up in the inner ear. The buildup can cause the inner ear to send the wrong message to the brain. Your doctor may move you in different positions to help your vertigo get better faster. This is called the Epley maneuver. Your doctor may also prescribe medicines or exercises to help with your symptoms. Follow-up care is a key part of your treatment and safety. Be sure to make and go to all appointments, and call your doctor if you are having problems. It's also a good idea to know your test results and keep a list of the medicines you take. How can you care for yourself at home? · If your doctor suggests that you do Wrad-Daroff exercises:  ? Sit on the edge of a bed or sofa. Quickly lie down on the side that causes the worst vertigo. Lie on your side with your ear down. ? Stay in this position for at least 30 seconds or until the vertigo goes away. ? Sit up. If this causes vertigo, wait for it to stop. ? Repeat the procedure on the other side. ? Repeat this 10 times. Do these exercises 2 times a day until the vertigo is gone. When should you call for help? Call 911 anytime you think you may need emergency care. For example, call if:    · You have symptoms of a stroke. These may include:  ? Sudden numbness, tingling, weakness, or loss of movement in your face, arm, or leg, especially on only one side of your body. ? Sudden vision changes. ? Sudden trouble speaking.   ? Sudden confusion or trouble understanding simple statements. ? Sudden problems with walking or balance. ? A sudden, severe headache that is different from past headaches. Call your doctor now or seek immediate medical care if:    · You have new or worse nausea and vomiting.     · You have new symptoms such as hearing loss or roaring in your ears. Watch closely for changes in your health, and be sure to contact your doctor if:    · You are not getting better as expected.     · Your vertigo gets worse. Where can you learn more? Go to http://www.gray.com/  Enter P372 in the search box to learn more about \"Benign Paroxysmal Positional Vertigo (BPPV): Care Instructions. \"  Current as of: April 15, 2020               Content Version: 12.6  © 4004-1712 [employer]. Care instructions adapted under license by "[employer]" (which disclaims liability or warranty for this information).  If you have questions about a medical condition or this instruction, always ask your healthcare professional. [place] [address] any warranty or liability for your use of this information.

## 2021-03-15 NOTE — PROGRESS NOTES
HPI     Chief Complaint   Patient presents with    Pain (Chronic)     f/u     She is a 26 y.o. female who presents for chronic pain. Patient has known diagnosis of lupus, chronic jaw pain from injury in 2013, poor dentition from chronic steroid use. Missed appt with Rhum last week. Rescheduled 3/23 and has surgery to have teeth pulled 3/24. States pain is currently 8/10. Has been taking Ibuprofen, Tylenol \"around the clock. \" Has used Voltaren gel and lidocaine cream. Took a leftover Lorazepam she had at home. Nothing helps. States the pain is in R neck going into arm and R jaw. She does have chronic neck pain for which she has seen Ortho and been diagnosed with cervical radiculopathy in the past. She was told to do PT before ordering MRI but she has not done PT. Denies vision changes, numbness, weakness, speech changes. Review of Systems   Constitutional: Negative for chills and fever. Eyes: Negative for visual disturbance. Neurological: Negative for facial asymmetry, speech difficulty, weakness and numbness. Reviewed PmHx, RxHx, FmHx, SocHx, AllgHx and updated and dated in the chart. Physical Exam:  Visit Vitals  /78 (BP 1 Location: Left arm, BP Patient Position: Sitting, BP Cuff Size: Large adult)   Pulse 90   Temp (!) 96.6 °F (35.9 °C) (Temporal)   Resp 18   Ht 5' 1\" (1.549 m)   Wt 201 lb 12.8 oz (91.5 kg)   SpO2 100%   BMI 38.13 kg/m²     Physical Exam  Vitals signs and nursing note reviewed. Constitutional:       General: She is not in acute distress. Appearance: Normal appearance. She is not ill-appearing. HENT:      Head: Atraumatic. Comments: No facial fullness. Poor half-way without odor or signs of abscess. Mouth/Throat:      Mouth: Mucous membranes are moist.   Eyes:      Extraocular Movements: Extraocular movements intact. Neck:      Musculoskeletal: Normal range of motion and neck supple. No neck rigidity or muscular tenderness.       Comments: Turning head to R reproduces sensation of dizziness that improves after a few minutes. Cardiovascular:      Rate and Rhythm: Normal rate and regular rhythm. Heart sounds: No murmur. Pulmonary:      Effort: Pulmonary effort is normal. No respiratory distress. Breath sounds: Normal breath sounds. Lymphadenopathy:      Cervical: No cervical adenopathy. Neurological:      General: No focal deficit present. Mental Status: She is alert. Comments: EOM intact. CN II-XII intact except she complains of decreased sensation on R side of face compared to L. Strength 5/5 in upper and lower ext. Gross sensation intact. Speech is normal. Answers questions appropriately. Follows commands. Able to get on and off exam table without difficulty. Gait is normal. + Larwence Saunas on R. Psychiatric:         Mood and Affect: Mood normal.         Behavior: Behavior normal.       No results found for this or any previous visit (from the past 12 hour(s)). Assessment / Plan     Diagnoses and all orders for this visit:    1. Right facial numbness  -     CT HEAD WO CONT; Future  -     CT MAXILLOFACIAL W CONT; Future    2. Dizziness  -     CT HEAD WO CONT; Future  -     CT MAXILLOFACIAL W CONT; Future    3. Poor dentition  -     CT MAXILLOFACIAL W CONT; Future    4. Neck pain  -     naproxen (NAPROSYN) 500 mg tablet; Take 1 Tab by mouth two (2) times daily (with meals). -     lidocaine (LIDODERM) 5 %; Apply patch to the affected area for 12 hours a day and remove for 12 hours a day. 5. NSAID long-term use  -     METABOLIC PANEL, BASIC; Future       - Unclear etiology of neuro symptoms. She declined ER eval today. - Discussed with Radiology who recommend CT head and CT face W contrast given hx of dentition/ jaw problems. These tests were ordered/ scheduled. Patient then stated she didn't know if she wanted to do imaging due to cost concerns.  Recommend she call central scheduling to inquire about costs/ payment plans. We discussed the importance of not only treating her pain but also working it up appropriately. - Inquiring about pain management today. Given Lidocaine patches, Naproxen BID dosing. Discussed to stop Ibuprofen and use Tylenol less frequently. Unsure if Prednisone would delay her dental procedure but will call tomorrow to ask. We discussed if her symptoms are worsening or she develops any other neuro changes she needs to be evaluated in the ER immediatly. I have discussed the diagnosis with the patient and the intended plan as seen in the above orders. The patient has received an after-visit summary and questions were answered concerning future plans. I have discussed medication side effects and warnings with the patient as well.     Yi Sweeney, DO

## 2021-03-16 LAB
ANION GAP SERPL CALC-SCNC: 9 MMOL/L (ref 5–15)
BUN SERPL-MCNC: 9 MG/DL (ref 6–20)
BUN/CREAT SERPL: 14 (ref 12–20)
CALCIUM SERPL-MCNC: 9.3 MG/DL (ref 8.5–10.1)
CHLORIDE SERPL-SCNC: 105 MMOL/L (ref 97–108)
CO2 SERPL-SCNC: 25 MMOL/L (ref 21–32)
CREAT SERPL-MCNC: 0.66 MG/DL (ref 0.55–1.02)
GLUCOSE SERPL-MCNC: 119 MG/DL (ref 65–100)
POTASSIUM SERPL-SCNC: 4.3 MMOL/L (ref 3.5–5.1)
SODIUM SERPL-SCNC: 139 MMOL/L (ref 136–145)

## 2021-03-22 ENCOUNTER — TELEPHONE (OUTPATIENT)
Dept: RHEUMATOLOGY | Age: 26
End: 2021-03-22

## 2021-03-23 ENCOUNTER — OFFICE VISIT (OUTPATIENT)
Dept: RHEUMATOLOGY | Age: 26
End: 2021-03-23
Payer: COMMERCIAL

## 2021-03-23 VITALS
BODY MASS INDEX: 37.57 KG/M2 | DIASTOLIC BLOOD PRESSURE: 88 MMHG | HEART RATE: 88 BPM | RESPIRATION RATE: 18 BRPM | SYSTOLIC BLOOD PRESSURE: 134 MMHG | TEMPERATURE: 97.1 F | HEIGHT: 61 IN | WEIGHT: 199 LBS

## 2021-03-23 DIAGNOSIS — R76.8 POSITIVE ANA (ANTINUCLEAR ANTIBODY): ICD-10-CM

## 2021-03-23 DIAGNOSIS — M22.2X1 PATELLOFEMORAL ARTHRALGIA OF BOTH KNEES: ICD-10-CM

## 2021-03-23 DIAGNOSIS — M54.2 MUSCULOSKELETAL NECK PAIN: Primary | ICD-10-CM

## 2021-03-23 DIAGNOSIS — M22.2X2 PATELLOFEMORAL ARTHRALGIA OF BOTH KNEES: ICD-10-CM

## 2021-03-23 PROCEDURE — G8427 DOCREV CUR MEDS BY ELIG CLIN: HCPCS | Performed by: INTERNAL MEDICINE

## 2021-03-23 PROCEDURE — G8417 CALC BMI ABV UP PARAM F/U: HCPCS | Performed by: INTERNAL MEDICINE

## 2021-03-23 PROCEDURE — 99205 OFFICE O/P NEW HI 60 MIN: CPT | Performed by: INTERNAL MEDICINE

## 2021-03-23 PROCEDURE — G9717 DOC PT DX DEP/BP F/U NT REQ: HCPCS | Performed by: INTERNAL MEDICINE

## 2021-03-23 RX ORDER — CLINDAMYCIN HYDROCHLORIDE 300 MG/1
300 CAPSULE ORAL 3 TIMES DAILY
Status: ON HOLD | COMMUNITY
End: 2021-08-02

## 2021-03-23 RX ORDER — IBUPROFEN 200 MG
400 TABLET ORAL
COMMUNITY
End: 2021-08-04

## 2021-03-23 RX ORDER — KETOROLAC TROMETHAMINE 10 MG/1
TABLET, FILM COATED ORAL
Status: ON HOLD | COMMUNITY
End: 2021-08-02
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
## 2021-03-23 NOTE — PATIENT INSTRUCTIONS
Hypermobility Syndrome. This is an heritable disorder that is associated with chronic idiopathic pain due to laxity of ligaments and tendons (Shavonne SILVA et al. Phyiostherapy. 2013 Oct 5). The management to better function and reduce pain is through a physical conditioning program. Physical therapy with individualized muscle strengthening is key, in particular core strengthening. Referral to a rehabilitation medicine specialist who has expertise in treating patients with hypermobility-related conditions may be appropriate if she does not improve. I recommend core strengthening exercises, like [place] and/or Yoga. I do not recommend Cross Fit or high intensity training exercises      Patellofemoral Arthralgia. This is a benign knee condition due to mistracking of the patella that may cause pain in the knees with prolonged sitting, or going up or down stairs. This tends to be present in hypermobile knees and may become worse if obesity is present. Treatment is to strengthen the quadriceps muscles and weight loss. I referred you to physical therapy.

## 2021-03-23 NOTE — LETTER
3/23/2021 Patient: Sandro Bazan YOB: 1995 Date of Visit: 3/23/2021 [address] [facility] [address] [facility] [address] [id number] [address] [employer] Dear Pepper Purvis DO, Thank you for referring Ms. Sandro Bazan to [facility] for evaluation. My notes for this consultation are attached. If you have questions, please do not hesitate to call me. I look forward to following your patient along with you.  
 
 
Sincerely, 
 
Jonathon Zacarias MD

## 2021-03-24 ENCOUNTER — LAB ONLY (OUTPATIENT)
Dept: FAMILY MEDICINE CLINIC | Age: 26
End: 2021-03-24

## 2021-03-24 DIAGNOSIS — R76.8 POSITIVE ANA (ANTINUCLEAR ANTIBODY): ICD-10-CM

## 2021-03-24 LAB
BLOOD BANK CMNT PATIENT-IMP: NORMAL
DAT POLY-SP REAG RBC QL: NORMAL

## 2021-03-25 LAB
25(OH)D3 SERPL-MCNC: 20.8 NG/ML (ref 30–100)
ALBUMIN SERPL-MCNC: 3.5 G/DL (ref 3.5–5)
ALBUMIN/GLOB SERPL: 1 {RATIO} (ref 1.1–2.2)
ALP SERPL-CCNC: 91 U/L (ref 45–117)
ALT SERPL-CCNC: 30 U/L (ref 12–78)
ANION GAP SERPL CALC-SCNC: 9 MMOL/L (ref 5–15)
APPEARANCE UR: CLEAR
AST SERPL-CCNC: 25 U/L (ref 15–37)
BACTERIA URNS QL MICRO: ABNORMAL /HPF
BASOPHILS # BLD: 0.1 K/UL (ref 0–0.1)
BASOPHILS NFR BLD: 1 % (ref 0–1)
BILIRUB SERPL-MCNC: 0.2 MG/DL (ref 0.2–1)
BILIRUB UR QL: NEGATIVE
BUN SERPL-MCNC: 13 MG/DL (ref 6–20)
BUN/CREAT SERPL: 19 (ref 12–20)
CALCIUM SERPL-MCNC: 9.2 MG/DL (ref 8.5–10.1)
CHLORIDE SERPL-SCNC: 107 MMOL/L (ref 97–108)
CO2 SERPL-SCNC: 24 MMOL/L (ref 21–32)
COLOR UR: ABNORMAL
CREAT SERPL-MCNC: 0.68 MG/DL (ref 0.55–1.02)
CREAT UR-MCNC: 206 MG/DL
DIFFERENTIAL METHOD BLD: NORMAL
EOSINOPHIL # BLD: 0.3 K/UL (ref 0–0.4)
EOSINOPHIL NFR BLD: 6 % (ref 0–7)
EPITH CASTS URNS QL MICRO: ABNORMAL /LPF
ERYTHROCYTE [DISTWIDTH] IN BLOOD BY AUTOMATED COUNT: 13.9 % (ref 11.5–14.5)
GLOBULIN SER CALC-MCNC: 3.6 G/DL (ref 2–4)
GLUCOSE SERPL-MCNC: 97 MG/DL (ref 65–100)
GLUCOSE UR STRIP.AUTO-MCNC: NEGATIVE MG/DL
HCT VFR BLD AUTO: 37.3 % (ref 35–47)
HGB BLD-MCNC: 11.8 G/DL (ref 11.5–16)
HGB UR QL STRIP: NEGATIVE
IGA SERPL-MCNC: 218 MG/DL (ref 70–400)
IGG SERPL-MCNC: 995 MG/DL (ref 700–1600)
IGM SERPL-MCNC: 71 MG/DL (ref 40–230)
IMM GRANULOCYTES # BLD AUTO: 0 K/UL (ref 0–0.04)
IMM GRANULOCYTES NFR BLD AUTO: 0 % (ref 0–0.5)
KETONES UR QL STRIP.AUTO: NEGATIVE MG/DL
LEUKOCYTE ESTERASE UR QL STRIP.AUTO: NEGATIVE
LYMPHOCYTES # BLD: 1.7 K/UL (ref 0.8–3.5)
LYMPHOCYTES NFR BLD: 33 % (ref 12–49)
MCH RBC QN AUTO: 26.3 PG (ref 26–34)
MCHC RBC AUTO-ENTMCNC: 31.6 G/DL (ref 30–36.5)
MCV RBC AUTO: 83.1 FL (ref 80–99)
MONOCYTES # BLD: 0.3 K/UL (ref 0–1)
MONOCYTES NFR BLD: 5 % (ref 5–13)
NEUTS SEG # BLD: 2.7 K/UL (ref 1.8–8)
NEUTS SEG NFR BLD: 55 % (ref 32–75)
NITRITE UR QL STRIP.AUTO: NEGATIVE
NRBC # BLD: 0 K/UL (ref 0–0.01)
NRBC BLD-RTO: 0 PER 100 WBC
PH UR STRIP: 5.5 [PH] (ref 5–8)
PLATELET # BLD AUTO: 254 K/UL (ref 150–400)
PMV BLD AUTO: 10.9 FL (ref 8.9–12.9)
POTASSIUM SERPL-SCNC: 4.3 MMOL/L (ref 3.5–5.1)
PROT SERPL-MCNC: 7.1 G/DL (ref 6.4–8.2)
PROT UR STRIP-MCNC: NEGATIVE MG/DL
PROT UR-MCNC: 22 MG/DL (ref 0–11.9)
PROT/CREAT UR-RTO: 0.1
RBC # BLD AUTO: 4.49 M/UL (ref 3.8–5.2)
RBC #/AREA URNS HPF: ABNORMAL /HPF (ref 0–5)
SODIUM SERPL-SCNC: 140 MMOL/L (ref 136–145)
SP GR UR REFRACTOMETRY: 1.03 (ref 1–1.03)
UA: UC IF INDICATED,UAUC: ABNORMAL
UROBILINOGEN UR QL STRIP.AUTO: 0.2 EU/DL (ref 0.2–1)
WBC # BLD AUTO: 5 K/UL (ref 3.6–11)
WBC URNS QL MICRO: ABNORMAL /HPF (ref 0–4)

## 2021-03-26 LAB
C3 SERPL-MCNC: 195 MG/DL (ref 82–167)
C4 SERPL-MCNC: 49 MG/DL (ref 12–38)
CH50 SERPL-ACNC: >60 U/ML
ENA SM AB SER-ACNC: <0.2 AI (ref 0–0.9)
ENA SS-A AB SER-ACNC: <0.2 AI (ref 0–0.9)
ENA SS-B AB SER-ACNC: <0.2 AI (ref 0–0.9)
T PALLIDUM AB SER QL IA: NON REACTIVE

## 2021-03-29 LAB
ALBUMIN SERPL ELPH-MCNC: 3.4 G/DL (ref 2.9–4.4)
ALBUMIN/GLOB SERPL: 0.9 {RATIO} (ref 0.7–1.7)
ALPHA1 GLOB SERPL ELPH-MCNC: 0.3 G/DL (ref 0–0.4)
ALPHA2 GLOB SERPL ELPH-MCNC: 0.8 G/DL (ref 0.4–1)
B-GLOBULIN SERPL ELPH-MCNC: 1.4 G/DL (ref 0.7–1.3)
GAMMA GLOB SERPL ELPH-MCNC: 1.1 G/DL (ref 0.4–1.8)
GLOBULIN SER CALC-MCNC: 3.7 G/DL (ref 2.2–3.9)
M PROTEIN SERPL ELPH-MCNC: ABNORMAL G/DL
PROT PATTERN SERPL ELPH-IMP: ABNORMAL
PROT SERPL-MCNC: 7.1 G/DL (ref 6–8.5)

## 2021-03-31 ENCOUNTER — APPOINTMENT (OUTPATIENT)
Dept: PHYSICAL THERAPY | Age: 26
End: 2021-03-31

## 2021-04-01 LAB — ANTI-NUCLEAR AB BY IFA (RDL): NORMAL

## 2021-04-01 NOTE — PROGRESS NOTES
The results were reviewed. Positive ARAVIND 1:160 (homogeneous). Pending anti-dsDNA, antiphospholipid antibodies. Low vitamin D 20.8. All remaining labs are normal/negative. Prelim labs are not consistent with systemic lupus erythematosus.

## 2021-04-08 ENCOUNTER — HOSPITAL ENCOUNTER (OUTPATIENT)
Dept: PHYSICAL THERAPY | Age: 26
Discharge: HOME | End: 2021-04-08
Payer: MEDICAID

## 2021-04-08 PROCEDURE — 97161 PT EVAL LOW COMPLEX 20 MIN: CPT | Performed by: PHYSICAL THERAPIST

## 2021-04-08 NOTE — PROGRESS NOTES
[facility] Physical Therapy  [address]  [place], [address]  Phone: [phone]  Fax: [phone]    Plan of Care/Statement of Necessity for Physical Therapy Services  2-15    Patient name: Jane Smiley  : 1995  Provider#: [id number]  Referral source: Dangelo Younger MD      Medical/Treatment Diagnosis: Cervicalgia [M54.2]     Prior Hospitalization: see medical history     Comorbidities: see evaluation  Prior Level of Function:see evaluation  Medications: Verified on Patient Summary List  Start of Care: 2021     Onset Date:see evaluation     The Plan of Care and following information is based on the information from the initial evaluation.     Assessment/ key information: Patient presents with signs and symptoms consistent with hypermobility contributing to cervical pain, lindsey knees, and right wrist pain and will benefit from physical therapy to address deficits noted below in problem list.   Evaluation Complexity History LOW Complexity : Zero comorbidities / personal factors that will impact the outcome / POC; Examination LOW Complexity : 1-2 Standardized tests and measures addressing body structure, function, activity limitation and / or participation in recreation  ;Presentation LOW Complexity : Stable, uncomplicated  ;Clinical Decision Making Other outcome measures clinical judgment  LOW   Overall Complexity Rating: LOW   Problem List: pain affecting function, decrease ADL/ functional abilitiies, decrease activity tolerance and other hypermobility   Treatment Plan may include any combination of the following: Therapeutic exercise, Therapeutic activities, Neuromuscular re-education, Physical agent/modality, Manual therapy, Patient education and Self Care training  Patient / Family readiness to learn indicated by: asking questions, trying to perform skills and interest  Persons(s) to be included in education: patient (P)  Barriers to Learning/Limitations: None  Patient Goal (s): please see evaluation in Connect Care  Patient Self Reported Health Status: please see paper chart  Rehabilitation Potential: good    Short Term Goals: To be accomplished in 5 treatments:    -Independent in HEP as evidenced on ability to perform at least 5 exercises from HEP using proper form without verbal cuing.   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 Patient/ Caregiver education and instruction: self care, activity modification and exercises    [x]  Plan of care has been reviewed with PTA    Certification Period: 4/8/2021 -  7/8/21    Lucita Block. Bossman PT, DPT, CMTPT      2/6/4188 50:70 PM  PT License Number: [id number]  _____________________________________________________________________    I certify that the above Therapy Services are being furnished while the patient is under my care. I agree with the treatment plan and certify that this therapy is necessary.     [address] [facility] Signature:____________________  Date:____________Time:_________
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
## 2021-04-12 ENCOUNTER — HOSPITAL ENCOUNTER (OUTPATIENT)
Dept: PHYSICAL THERAPY | Age: 26
Discharge: HOME | End: 2021-04-12
Payer: MEDICAID

## 2021-04-12 PROCEDURE — 97110 THERAPEUTIC EXERCISES: CPT | Performed by: PHYSICAL THERAPIST

## 2021-04-13 ENCOUNTER — APPOINTMENT (OUTPATIENT)
Dept: PHYSICAL THERAPY | Age: 26
End: 2021-04-13
Payer: MEDICAID

## 2021-04-14 ENCOUNTER — APPOINTMENT (OUTPATIENT)
Dept: PHYSICAL THERAPY | Age: 26
End: 2021-04-14
Payer: MEDICAID

## 2021-04-16 LAB
APTT HEX PL PPP: 0 SEC
APTT IMM NP PPP: NORMAL SEC
APTT PPP 1:1 SALINE: NORMAL SEC
APTT PPP: 25.3 SEC
B2 GLYCOPROT1 IGA SER-ACNC: <10 SAU
B2 GLYCOPROT1 IGG SER-ACNC: <10 SGU
B2 GLYCOPROT1 IGM SER-ACNC: <10 SMU
CARDIOLIPIN IGA SER IA-ACNC: <10 APL
CARDIOLIPIN IGG SER IA-ACNC: <10 GPL
CARDIOLIPIN IGM SER IA-ACNC: <10 MPL
CONFIRM DRVVT: NORMAL SEC
LAC INTERPRETATION, 502038: NORMAL
PLATELET NEUTRALIZATION 500049: 1.3 SEC
PROTHROM IGG SERPL-ACNC: 2 G UNITS
PROTHROM IGM SERPL-ACNC: NORMAL M UNITS
PS IGG SER IA-ACNC: 3 GPS
PS IGM SER IA-ACNC: 0 MPS
SCREEN DRVVT/NORMAL: NORMAL RATIO
SCREEN DRVVT: 39.9 SEC

## 2021-04-19 ENCOUNTER — HOSPITAL ENCOUNTER (OUTPATIENT)
Dept: PHYSICAL THERAPY | Age: 26
Discharge: HOME | End: 2021-04-19
Payer: MEDICAID

## 2021-04-19 PROCEDURE — 97110 THERAPEUTIC EXERCISES: CPT | Performed by: PHYSICAL THERAPIST

## 2021-04-19 NOTE — PROGRESS NOTES
PT DAILY TREATMENT NOTE - [facility] 2-15    Patient Name: Chhaya Ceja  Date:2021  : 1995  [x]  Patient  Verified  Payor: Agatha Roman / Plan: [facility] [place] [address] [facility] CCCP / Product Type: Managed Care Medicaid /    In time:155 P Out time: 325 P  Total Treatment Time (min): 90  Total Timed Codes (min): 75  Visit #:  3    Treatment Area: Cervicalgia [M54.2]    SUBJECTIVE  Pain Level (0-10 scale):5  Any medication changes, allergies to medications, adverse drug reactions, diagnosis change, or new procedure performed?: [x] No    [] Yes (see summary sheet for update)  Subjective functional status/changes:     Pt had a flare up last week so had to miss therapy. Overall has been compliant with HEP. OBJECTIVE    Modality rationale: decrease inflammation to improve the patients ability to perform ADL's. Min Type Additional Details   15 [x]  Ice     []  Heat Position: supine, lindsey LE's supported    Location: lindsey knees     [x] Skin assessment post-treatment:  [x]intact []redness- no adverse reaction    []redness  adverse reaction:     75 min Therapeutic Exercise:  [x] See flow sheet :   Rationale: increase strength, improve coordination, improve balance, and increase proprioception to improve the patients ability to perform ADL's            With   [] TE   [] manual Patient Education: [x] Review HEP    [] Progressed/Changed HEP based on:   [] positioning   [] body mechanics   [] transfers   [] heat/ice application    [] other:      Other Objective/Functional Measures:       Pain Level (0-10 scale) post treatment: 3    ASSESSMENT/Changes in Function:     Patient will continue to benefit from skilled PT services to modify and progress therapeutic interventions, address functional mobility deficits, address strength deficits, analyze and address soft tissue restrictions, analyze and cue movement patterns, and analyze and modify body mechanics/ergonomics to attain remaining goals. Progress towards goals / Updated goals:  Since pt with more LE pain today, held on adding new ex's for LE. Able to tolerate addition of UE exercises without discomfort. PLAN  []  Upgrade activities as tolerated     []  Continue plan of care  []  Update interventions per flow sheet       []  Discharge due to:_  []  Other    Tammi Keita, PT 4/19/2021

## 2021-04-27 ENCOUNTER — APPOINTMENT (OUTPATIENT)
Dept: PHYSICAL THERAPY | Age: 26
End: 2021-04-27
Payer: MEDICAID

## 2021-04-29 ENCOUNTER — HOSPITAL ENCOUNTER (OUTPATIENT)
Dept: PHYSICAL THERAPY | Age: 26
Discharge: HOME | End: 2021-04-29
Payer: MEDICAID

## 2021-04-29 PROCEDURE — 97110 THERAPEUTIC EXERCISES: CPT | Performed by: PHYSICAL THERAPIST

## 2021-04-29 NOTE — PROGRESS NOTES
PT DAILY TREATMENT NOTE - [facility] 2-15    Patient Name: Lawrence Mendoza  Date:2021  : 1995  [x]  Patient  Verified  Payor: Bandar Ramirez / Plan: [facility] [place] [facility] CCCP / Product Type: Managed Care Medicaid /    In time:200 P  Out time: 320 P   Total Treatment Time (min):80  Total Timed Codes (min):80  Visit #:  4    Treatment Area: Cervicalgia [M54.2]    SUBJECTIVE  Pain Level (0-10 scale):1  Any medication changes, allergies to medications, adverse drug reactions, diagnosis change, or new procedure performed?: [x] No    [] Yes (see summary sheet for update)  Subjective functional status/changes:     Much better. OBJECTIVE    Modality rationale: decrease inflammation to improve the patients ability to perform ADL's. Min Type Additional Details   declined [x]  Ice     []  Heat Position: supine, lindsey LE's supported    Location: lindsey knees     [x] Skin assessment post-treatment:  [x]intact []redness- no adverse reaction    []redness  adverse reaction:     80 min Therapeutic Exercise:  [x] See flow sheet :   Rationale: increase strength, improve coordination, improve balance, and increase proprioception to improve the patients ability to perform ADL's            With   [] TE   [] manual Patient Education: [x] Review HEP    [] Progressed/Changed HEP based on:   [] positioning   [] body mechanics   [] transfers   [] heat/ice application    [] other:      Other Objective/Functional Measures:       Pain Level (0-10 scale) post treatment: 0    ASSESSMENT/Changes in Function:     Patient will continue to benefit from skilled PT services to modify and progress therapeutic interventions, address functional mobility deficits, address strength deficits, analyze and address soft tissue restrictions, analyze and cue movement patterns, and analyze and modify body mechanics/ergonomics to attain remaining goals. Progress towards goals / Updated goals:  Excellent form noted during ex's.  Able to tolerate progression of weights and sets as noted on flow. PLAN  []  Upgrade activities as tolerated     []  Continue plan of care  []  Update interventions per flow sheet       []  Discharge due to:_  []  Other    Mandi Walsh.  Bossman, PT 4/29/2021

## 2021-05-03 ENCOUNTER — APPOINTMENT (OUTPATIENT)
Dept: PHYSICAL THERAPY | Age: 26
End: 2021-05-03

## 2021-05-23 ENCOUNTER — HOSPITAL ENCOUNTER (EMERGENCY)
Age: 26
Discharge: HOME | End: 2021-05-23
Attending: EMERGENCY MEDICINE
Payer: MEDICAID

## 2021-05-23 ENCOUNTER — APPOINTMENT (OUTPATIENT)
Dept: GENERAL RADIOLOGY | Age: 26
End: 2021-05-23
Attending: EMERGENCY MEDICINE
Payer: MEDICAID

## 2021-05-23 VITALS
HEART RATE: 90 BPM | TEMPERATURE: 98.9 F | DIASTOLIC BLOOD PRESSURE: 67 MMHG | RESPIRATION RATE: 15 BRPM | SYSTOLIC BLOOD PRESSURE: 136 MMHG | OXYGEN SATURATION: 99 %

## 2021-05-23 DIAGNOSIS — S93.509A TOE SPRAIN, INITIAL ENCOUNTER: Primary | ICD-10-CM

## 2021-05-23 PROCEDURE — 99282 EMERGENCY DEPT VISIT SF MDM: CPT

## 2021-05-23 PROCEDURE — 73660 X-RAY EXAM OF TOE(S): CPT

## 2021-05-23 NOTE — ED NOTES
Dr. Pedro Elena reviewed discharge instructions with the patient. The patient verbalized understanding. Patient ambulated out of the emergency department in cast shoe she arrived with. Patient remains in pain, but is in no apparent distress.

## 2021-05-23 NOTE — ED TRIAGE NOTES
Patient presents to the emergency department reporting she tripped and fell yesterday. Patient reports pain to the right fourth toe. Patient bought a cast shoe, and has been walking with it since.

## 2021-05-24 NOTE — ED PROVIDER NOTES
43-year-old female presents with an injury to her fourth toe on the right foot. Patient states that she stubbed her toe yesterday tripping and falling. She had pain and some swelling mostly around the fourth toe. She denies any obvious deformities. She purchased a cast shoe and has been walking on it with that. Pain worsened with movement of the toe and weightbearing. She is taking no pain medications. Past Medical History:   Diagnosis Date    Anxiety     Chronic pain     Depression     Endometriosis     Headache     Lupus ([facility] [address] 2017    Migraines     Patella-femoral syndrome        Past Surgical History:   Procedure Laterality Date    HX HEENT  2001    swallowed foreign body nickel         Family History:   Problem Relation Age of Onset    Hypertension Mother     Arthritis-rheumatoid Mother     Hypertension Father     Arthritis-rheumatoid Brother        Social History     Socioeconomic History    Marital status: SINGLE     Spouse name: Not on file    Number of children: Not on file    Years of education: Not on file    Highest education level: Not on file   Occupational History    Not on file   Tobacco Use    Smoking status: Never Smoker    Smokeless tobacco: Never Used   Vaping Use    Vaping Use: Never used   Substance and Sexual Activity    Alcohol use: Yes     Comment: social    Drug use: Yes     Types: Marijuana    Sexual activity: Never   Other Topics Concern    Not on file   Social History Narrative    Not on file     Social Determinants of Health     Financial Resource Strain:     Difficulty of Paying Living Expenses:    Food Insecurity:     Worried About Running Out of Food in the Last Year:     Ran Out of Food in the Last Year:    Transportation Needs:     Lack of Transportation (Medical):      Lack of Transportation (Non-Medical):    Physical Activity:     Days of Exercise per Week:     Minutes of Exercise per Session:    Stress:     Feeling of Stress : Social Connections:     Frequency of Communication with Friends and Family:     Frequency of Social Gatherings with Friends and Family:     Attends Hoahaoism Services:     Active Member of Clubs or Organizations:     Attends Club or Organization Meetings:     Marital Status:    Intimate Partner Violence:     Fear of Current or Ex-Partner:     Emotionally Abused:     Physically Abused:     Sexually Abused: ALLERGIES: Pcn [penicillins] and Plaquenil [hydroxychloroquine]    Review of Systems   Constitutional: Negative for fever. HENT: Negative for facial swelling. Eyes: Negative for visual disturbance. Respiratory: Negative for chest tightness. Cardiovascular: Negative for chest pain. Gastrointestinal: Negative for abdominal pain. Genitourinary: Negative for difficulty urinating and dysuria. Musculoskeletal: Negative for arthralgias. Skin: Negative for rash. Neurological: Negative for headaches. Hematological: Negative for adenopathy. Psychiatric/Behavioral: Negative for suicidal ideas. Vitals:    05/23/21 1634   BP: 136/67   Pulse: 90   Resp: 15   Temp: 98.9 °F (37.2 °C)   SpO2: 99%            Physical Exam  Vitals and nursing note reviewed. Constitutional:       General: She is not in acute distress. Appearance: She is well-developed. HENT:      Head: Normocephalic and atraumatic. Eyes:      General: No scleral icterus. Conjunctiva/sclera: Conjunctivae normal.      Pupils: Pupils are equal, round, and reactive to light. Cardiovascular:      Rate and Rhythm: Normal rate. Heart sounds: No murmur heard. Pulmonary:      Effort: Pulmonary effort is normal. No respiratory distress. Abdominal:      General: There is no distension. Musculoskeletal:      Cervical back: Normal range of motion and neck supple. Comments: Mild swelling to the right fourth toe. No obvious deformity. Pain worsened with range of motion.    Skin:     General: Skin is warm and dry. Findings: No rash. Neurological:      Mental Status: She is alert and oriented to person, place, and time.           MDM       Procedures

## 2021-07-13 RX ORDER — FLUOXETINE HYDROCHLORIDE 40 MG/1
40 CAPSULE ORAL DAILY
Qty: 30 CAPSULE | Refills: 0 | Status: ON HOLD | OUTPATIENT
Start: 2021-07-13 | End: 2021-08-04 | Stop reason: SDUPTHER

## 2021-07-13 RX ORDER — MIRTAZAPINE 15 MG/1
15 TABLET, FILM COATED ORAL
Qty: 30 TABLET | Refills: 0 | Status: ON HOLD | OUTPATIENT
Start: 2021-07-13 | End: 2021-08-04 | Stop reason: SDUPTHER

## 2021-08-02 ENCOUNTER — HOSPITAL ENCOUNTER (INPATIENT)
Age: 26
LOS: 2 days | Discharge: HOME | DRG: 751 | End: 2021-08-04
Attending: EMERGENCY MEDICINE | Admitting: PSYCHIATRY & NEUROLOGY
Payer: MEDICAID

## 2021-08-02 DIAGNOSIS — F41.8 ANXIETY ASSOCIATED WITH DEPRESSION: Primary | ICD-10-CM

## 2021-08-02 DIAGNOSIS — R45.851 SUICIDAL IDEATION: ICD-10-CM

## 2021-08-02 DIAGNOSIS — R31.9 URINARY TRACT INFECTION WITH HEMATURIA, SITE UNSPECIFIED: ICD-10-CM

## 2021-08-02 DIAGNOSIS — N39.0 URINARY TRACT INFECTION WITH HEMATURIA, SITE UNSPECIFIED: ICD-10-CM

## 2021-08-02 DIAGNOSIS — Z00.00 GENERAL MEDICAL EXAM: ICD-10-CM

## 2021-08-02 LAB
ALBUMIN SERPL-MCNC: 4 G/DL (ref 3.5–5)
ALBUMIN/GLOB SERPL: 0.9 {RATIO} (ref 1.1–2.2)
ALP SERPL-CCNC: 114 U/L (ref 45–117)
ALT SERPL-CCNC: 25 U/L (ref 12–78)
AMPHET UR QL SCN: NEGATIVE
ANION GAP SERPL CALC-SCNC: 7 MMOL/L (ref 5–15)
APPEARANCE UR: ABNORMAL
AST SERPL-CCNC: 14 U/L (ref 15–37)
BACTERIA URNS QL MICRO: ABNORMAL /HPF
BARBITURATES UR QL SCN: NEGATIVE
BASOPHILS # BLD: 0.1 K/UL (ref 0–0.1)
BASOPHILS NFR BLD: 1 % (ref 0–1)
BENZODIAZ UR QL: NEGATIVE
BILIRUB SERPL-MCNC: 0.2 MG/DL (ref 0.2–1)
BILIRUB UR QL: NEGATIVE
BUN SERPL-MCNC: 12 MG/DL (ref 6–20)
BUN/CREAT SERPL: 15 (ref 12–20)
CALCIUM SERPL-MCNC: 9.7 MG/DL (ref 8.5–10.1)
CANNABINOIDS UR QL SCN: NEGATIVE
CHLORIDE SERPL-SCNC: 106 MMOL/L (ref 97–108)
CO2 SERPL-SCNC: 25 MMOL/L (ref 21–32)
COCAINE UR QL SCN: NEGATIVE
COLOR UR: ABNORMAL
COMMENT, HOLDF: NORMAL
CREAT SERPL-MCNC: 0.81 MG/DL (ref 0.55–1.02)
DIFFERENTIAL METHOD BLD: ABNORMAL
DRUG SCRN COMMENT,DRGCM: NORMAL
EOSINOPHIL # BLD: 0.1 K/UL (ref 0–0.4)
EOSINOPHIL NFR BLD: 1 % (ref 0–7)
EPITH CASTS URNS QL MICRO: ABNORMAL /LPF
ERYTHROCYTE [DISTWIDTH] IN BLOOD BY AUTOMATED COUNT: 13.3 % (ref 11.5–14.5)
ETHANOL SERPL-MCNC: <10 MG/DL
FLUAV RNA SPEC QL NAA+PROBE: NOT DETECTED
FLUBV RNA SPEC QL NAA+PROBE: NOT DETECTED
GLOBULIN SER CALC-MCNC: 4.7 G/DL (ref 2–4)
GLUCOSE SERPL-MCNC: 93 MG/DL (ref 65–100)
GLUCOSE UR STRIP.AUTO-MCNC: NEGATIVE MG/DL
HCG UR QL: NEGATIVE
HCT VFR BLD AUTO: 40.2 % (ref 35–47)
HGB BLD-MCNC: 12.8 G/DL (ref 11.5–16)
HGB UR QL STRIP: NEGATIVE
IMM GRANULOCYTES # BLD AUTO: 0 K/UL (ref 0–0.04)
IMM GRANULOCYTES NFR BLD AUTO: 0 % (ref 0–0.5)
KETONES UR QL STRIP.AUTO: ABNORMAL MG/DL
LEUKOCYTE ESTERASE UR QL STRIP.AUTO: ABNORMAL
LYMPHOCYTES # BLD: 1.4 K/UL (ref 0.8–3.5)
LYMPHOCYTES NFR BLD: 18 % (ref 12–49)
MCH RBC QN AUTO: 26.3 PG (ref 26–34)
MCHC RBC AUTO-ENTMCNC: 31.8 G/DL (ref 30–36.5)
MCV RBC AUTO: 82.5 FL (ref 80–99)
METHADONE UR QL: NEGATIVE
MONOCYTES # BLD: 0.3 K/UL (ref 0–1)
MONOCYTES NFR BLD: 4 % (ref 5–13)
NEUTS SEG # BLD: 5.8 K/UL (ref 1.8–8)
NEUTS SEG NFR BLD: 76 % (ref 32–75)
NITRITE UR QL STRIP.AUTO: NEGATIVE
NRBC # BLD: 0 K/UL (ref 0–0.01)
NRBC BLD-RTO: 0 PER 100 WBC
OPIATES UR QL: NEGATIVE
PCP UR QL: NEGATIVE
PH UR STRIP: 5 [PH] (ref 5–8)
PLATELET # BLD AUTO: 312 K/UL (ref 150–400)
PMV BLD AUTO: 11 FL (ref 8.9–12.9)
POTASSIUM SERPL-SCNC: 4.5 MMOL/L (ref 3.5–5.1)
PROT SERPL-MCNC: 8.7 G/DL (ref 6.4–8.2)
PROT UR STRIP-MCNC: NEGATIVE MG/DL
RBC # BLD AUTO: 4.87 M/UL (ref 3.8–5.2)
RBC #/AREA URNS HPF: ABNORMAL /HPF (ref 0–5)
SAMPLES BEING HELD,HOLD: NORMAL
SARS-COV-2, COV2: NOT DETECTED
SODIUM SERPL-SCNC: 138 MMOL/L (ref 136–145)
SP GR UR REFRACTOMETRY: 1.02 (ref 1–1.03)
UR CULT HOLD, URHOLD: NORMAL
UROBILINOGEN UR QL STRIP.AUTO: 0.2 EU/DL (ref 0.2–1)
WBC # BLD AUTO: 7.7 K/UL (ref 3.6–11)
WBC URNS QL MICRO: ABNORMAL /HPF (ref 0–4)

## 2021-08-02 PROCEDURE — 85025 COMPLETE CBC W/AUTO DIFF WBC: CPT

## 2021-08-02 PROCEDURE — 87077 CULTURE AEROBIC IDENTIFY: CPT

## 2021-08-02 PROCEDURE — 80053 COMPREHEN METABOLIC PANEL: CPT

## 2021-08-02 PROCEDURE — 81025 URINE PREGNANCY TEST: CPT

## 2021-08-02 PROCEDURE — 65220000003 HC RM SEMIPRIVATE PSYCH

## 2021-08-02 PROCEDURE — 82077 ASSAY SPEC XCP UR&BREATH IA: CPT

## 2021-08-02 PROCEDURE — 80307 DRUG TEST PRSMV CHEM ANLYZR: CPT

## 2021-08-02 PROCEDURE — 87086 URINE CULTURE/COLONY COUNT: CPT

## 2021-08-02 PROCEDURE — 36415 COLL VENOUS BLD VENIPUNCTURE: CPT

## 2021-08-02 PROCEDURE — 74011250637 HC RX REV CODE- 250/637: Performed by: EMERGENCY MEDICINE

## 2021-08-02 PROCEDURE — 87636 SARSCOV2 & INF A&B AMP PRB: CPT

## 2021-08-02 PROCEDURE — 81001 URINALYSIS AUTO W/SCOPE: CPT

## 2021-08-02 PROCEDURE — 74011250637 HC RX REV CODE- 250/637: Performed by: NURSE PRACTITIONER

## 2021-08-02 PROCEDURE — 99284 EMERGENCY DEPT VISIT MOD MDM: CPT

## 2021-08-02 PROCEDURE — 90791 PSYCH DIAGNOSTIC EVALUATION: CPT

## 2021-08-02 RX ORDER — HYDROXYZINE 50 MG/1
50 TABLET, FILM COATED ORAL
Status: DISCONTINUED | OUTPATIENT
Start: 2021-08-02 | End: 2021-08-04 | Stop reason: HOSPADM

## 2021-08-02 RX ORDER — BENZTROPINE MESYLATE 1 MG/1
1 TABLET ORAL
Status: DISCONTINUED | OUTPATIENT
Start: 2021-08-02 | End: 2021-08-04 | Stop reason: HOSPADM

## 2021-08-02 RX ORDER — TRAZODONE HYDROCHLORIDE 50 MG/1
50 TABLET ORAL
Status: DISCONTINUED | OUTPATIENT
Start: 2021-08-02 | End: 2021-08-03

## 2021-08-02 RX ORDER — DIPHENHYDRAMINE HYDROCHLORIDE 50 MG/ML
50 INJECTION, SOLUTION INTRAMUSCULAR; INTRAVENOUS
Status: DISCONTINUED | OUTPATIENT
Start: 2021-08-02 | End: 2021-08-04 | Stop reason: HOSPADM

## 2021-08-02 RX ORDER — SULFAMETHOXAZOLE AND TRIMETHOPRIM 800; 160 MG/1; MG/1
1 TABLET ORAL
Status: DISCONTINUED | OUTPATIENT
Start: 2021-08-02 | End: 2021-08-02 | Stop reason: SDUPTHER

## 2021-08-02 RX ORDER — HALOPERIDOL 5 MG/ML
5 INJECTION INTRAMUSCULAR
Status: DISCONTINUED | OUTPATIENT
Start: 2021-08-02 | End: 2021-08-04 | Stop reason: HOSPADM

## 2021-08-02 RX ORDER — IBUPROFEN 400 MG/1
400 TABLET ORAL
Status: DISCONTINUED | OUTPATIENT
Start: 2021-08-02 | End: 2021-08-04 | Stop reason: HOSPADM

## 2021-08-02 RX ORDER — MIRTAZAPINE 15 MG/1
15 TABLET, FILM COATED ORAL
Status: DISCONTINUED | OUTPATIENT
Start: 2021-08-02 | End: 2021-08-04 | Stop reason: HOSPADM

## 2021-08-02 RX ORDER — LORAZEPAM 2 MG/ML
1 INJECTION INTRAMUSCULAR
Status: DISCONTINUED | OUTPATIENT
Start: 2021-08-02 | End: 2021-08-04 | Stop reason: HOSPADM

## 2021-08-02 RX ORDER — ADHESIVE BANDAGE
30 BANDAGE TOPICAL DAILY PRN
Status: DISCONTINUED | OUTPATIENT
Start: 2021-08-02 | End: 2021-08-04 | Stop reason: HOSPADM

## 2021-08-02 RX ORDER — ACETAMINOPHEN 325 MG/1
650 TABLET ORAL
Status: DISCONTINUED | OUTPATIENT
Start: 2021-08-02 | End: 2021-08-04 | Stop reason: HOSPADM

## 2021-08-02 RX ORDER — SULFAMETHOXAZOLE AND TRIMETHOPRIM 800; 160 MG/1; MG/1
1 TABLET ORAL 2 TIMES DAILY
Status: DISCONTINUED | OUTPATIENT
Start: 2021-08-02 | End: 2021-08-04 | Stop reason: HOSPADM

## 2021-08-02 RX ORDER — OLANZAPINE 5 MG/1
5 TABLET ORAL
Status: DISCONTINUED | OUTPATIENT
Start: 2021-08-02 | End: 2021-08-04 | Stop reason: HOSPADM

## 2021-08-02 RX ADMIN — MIRTAZAPINE 15 MG: 15 TABLET, FILM COATED ORAL at 21:07

## 2021-08-02 RX ADMIN — IBUPROFEN 400 MG: 400 TABLET ORAL at 21:07

## 2021-08-02 RX ADMIN — SULFAMETHOXAZOLE AND TRIMETHOPRIM 1 TABLET: 800; 160 TABLET ORAL at 19:28

## 2021-08-02 NOTE — BH NOTES
TRANSFER - IN REPORT:    Verbal report received from [facility] on [address] [facility]  being received from [facility] [place] [facility] for routine progression of care      Report consisted of patients Situation, Background, Assessment and   Recommendations(SBAR). Information from the following report(s) SBAR was reviewed with the receiving nurse. Opportunity for questions and clarification was provided. Assessment completed upon patients arrival to unit and care assumed.
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
## 2021-08-02 NOTE — ED PROVIDER NOTES
This is a 27-year-old female who presents with a history of depression and anxiety. She had an argument with her brother her parents earlier today and decided that she wanted to die. She does not express any homicidal ideation. She has no significant medical conditions for which she is being treated other than possible connective tissue disorder with some chronic pain. Does have a history of migraines. Patient is here because she wanted some help psychiatrically. Patient has had no significant change in her dietary habits or sleep patterns. Past Medical History:   Diagnosis Date    Anxiety     Chronic pain     Depression     Endometriosis     Headache     Lupus ([address] 2017    Migraines     Patella-femoral syndrome        Past Surgical History:   Procedure Laterality Date    HX HEENT  2001    swallowed foreign body nickel         Family History:   Problem Relation Age of Onset    Hypertension Mother     Arthritis-rheumatoid Mother     Hypertension Father     Arthritis-rheumatoid Brother        Social History     Socioeconomic History    Marital status: SINGLE     Spouse name: Not on file    Number of children: Not on file    Years of education: Not on file    Highest education level: Not on file   Occupational History    Not on file   Tobacco Use    Smoking status: Never Smoker    Smokeless tobacco: Never Used   Vaping Use    Vaping Use: Never used   Substance and Sexual Activity    Alcohol use: Yes     Comment: social    Drug use: Yes     Types: Marijuana    Sexual activity: Never   Other Topics Concern    Not on file   Social History Narrative    Not on file     Social Determinants of Health     Financial Resource Strain:     Difficulty of Paying Living Expenses:    Food Insecurity:     Worried About Running Out of Food in the Last Year:     Ran Out of Food in the Last Year:    Transportation Needs:     Lack of Transportation (Medical):      Lack of Transportation (Non-Medical):    Physical Activity:     Days of Exercise per Week:     Minutes of Exercise per Session:    Stress:     Feeling of Stress :    Social Connections:     Frequency of Communication with Friends and Family:     Frequency of Social Gatherings with Friends and Family:     Attends [employer] Services:     Active Member of Clubs or Organizations:     Attends Club or Organization Meetings:     Marital Status:    Intimate Partner Violence:     Fear of Current or Ex-Partner:     Emotionally Abused:     Physically Abused:     Sexually Abused: ALLERGIES: Pcn [penicillins] and Plaquenil [hydroxychloroquine]    Review of Systems   Constitutional: Negative for activity change, appetite change and fatigue. HENT: Negative for ear pain, facial swelling, sore throat and trouble swallowing. Eyes: Negative for pain, discharge and visual disturbance. Respiratory: Negative for chest tightness, shortness of breath and wheezing. Cardiovascular: Negative for chest pain and palpitations. Gastrointestinal: Negative for abdominal pain, blood in stool, nausea and vomiting. Genitourinary: Negative for difficulty urinating, flank pain and hematuria. Musculoskeletal: Negative for arthralgias, joint swelling, myalgias and neck pain. Skin: Negative for color change and rash. Neurological: Negative for dizziness, weakness, numbness and headaches. Hematological: Negative for adenopathy. Does not bruise/bleed easily. Psychiatric/Behavioral: Positive for suicidal ideas. Negative for behavioral problems, confusion and sleep disturbance. All other systems reviewed and are negative.       Vitals:    08/02/21 1332 08/02/21 1542 08/02/21 1734   BP: 138/84  126/86   Pulse: (!) 120 (!) 126 98   Resp: 20 20 18   Temp: 97.5 °F (36.4 °C) 97.5 °F (36.4 °C) 97.6 °F (36.4 °C)   SpO2: 97% 100% 100%   Weight: 88.3 kg (194 lb 10.7 oz)     Height: 5' 1.5\" (1.562 m)              Physical Exam  Vitals and nursing note reviewed. Constitutional:       General: She is not in acute distress. Appearance: She is well-developed. HENT:      Head: Normocephalic and atraumatic. Nose: Nose normal.   Eyes:      General: No scleral icterus. Conjunctiva/sclera: Conjunctivae normal.      Pupils: Pupils are equal, round, and reactive to light. Neck:      Thyroid: No thyromegaly. Vascular: No JVD. Trachea: No tracheal deviation. Comments: No carotid bruits noted. Cardiovascular:      Rate and Rhythm: Normal rate and regular rhythm. Heart sounds: Normal heart sounds. No murmur heard. No friction rub. No gallop. Pulmonary:      Effort: Pulmonary effort is normal. No respiratory distress. Breath sounds: Normal breath sounds. No wheezing or rales. Chest:      Chest wall: No tenderness. Abdominal:      General: Bowel sounds are normal. There is no distension. Palpations: Abdomen is soft. There is no mass. Tenderness: There is no abdominal tenderness. There is no guarding or rebound. Musculoskeletal:         General: No tenderness. Normal range of motion. Cervical back: Normal range of motion and neck supple. Lymphadenopathy:      Cervical: No cervical adenopathy. Skin:     General: Skin is warm and dry. Findings: No erythema or rash. Neurological:      Mental Status: She is alert and oriented to person, place, and time. Cranial Nerves: No cranial nerve deficit. Coordination: Coordination normal.      Deep Tendon Reflexes: Reflexes are normal and symmetric. Psychiatric:         Behavior: Behavior normal.         Thought Content:  Thought content normal.         Judgment: Judgment normal.          MDM  Number of Diagnoses or Management Options     Amount and/or Complexity of Data Reviewed  Clinical lab tests: ordered and reviewed  Decide to obtain previous medical records or to obtain history from someone other than the patient: yes  Review and summarize past medical records: yes  Discuss the patient with other providers: yes    Risk of Complications, Morbidity, and/or Mortality  Presenting problems: high  Diagnostic procedures: high  Management options: high    Patient Progress  Patient progress: stable         Procedures    Patient has been seen by psychiatry and will admit. Adding results of labs for medical clearance. Labs appear unremarkable with the exception of the urine. Patient appears to have a UTI. Will treat with antibiotics and culture the urine and clear medically for admission.

## 2021-08-02 NOTE — BSMART NOTE
Pt admitted to [place] [address] by MICHELLE MackeyP for Dr. Eddy Ramos to room#730-B. Nursing report to . Nursing staff/[place] notified of admission.

## 2021-08-02 NOTE — ED TRIAGE NOTES
Triage Note: Patient is coming in for suicidal thoughts. Patient has eating disorder and the plan is to stop eating to kill self. Patient has been having argument with mother and has increased stress.

## 2021-08-02 NOTE — PROGRESS NOTES
Admission Note:     Admitting Diagnosis: Acute Distress Disorder; Anorexia    Admitting Status: Voluntary     Patient received from: [facility] ED     UDS: Negative     BAL: <10    Precautions: Behavioral, Safety     Primary Nurse Jackie Abarca and Awanda Scheuermann, RN performed a dual skin assessment on this patient No impairment noted  Randy score is 23    Multiple tattoos   Recent oral surgery; patient does not have any top teeth or lower back teeth. Problem: Falls - Risk of  Goal: *Absence of Falls  Description: Document Shahla Amado Fall Risk and appropriate interventions in the flowsheet. Outcome: Progressing Towards Goal  Note: Fall Risk Interventions    1720: Patient received to the unit via ambulatory from [facility] ED. Mood and affect; calm, cooperative, and pleasant; however dull and flat and depressed. Denies SI/HI. Denies AH/VH. Able to contract for safety while on the unit. Patient his history of self harming by cutting. Will continue to monitor q15 minutes for safety checks.

## 2021-08-03 LAB
CHOLEST SERPL-MCNC: 184 MG/DL
GLUCOSE BLD STRIP.AUTO-MCNC: 103 MG/DL (ref 65–117)
GLUCOSE P FAST SERPL-MCNC: 83 MG/DL (ref 65–100)
HDLC SERPL-MCNC: 58 MG/DL
HDLC SERPL: 3.2 {RATIO} (ref 0–5)
LDLC SERPL CALC-MCNC: 102 MG/DL (ref 0–100)
SERVICE CMNT-IMP: NORMAL
TRIGL SERPL-MCNC: 120 MG/DL (ref ?–150)
TSH SERPL DL<=0.05 MIU/L-ACNC: 2.14 UIU/ML (ref 0.36–3.74)
VLDLC SERPL CALC-MCNC: 24 MG/DL

## 2021-08-03 PROCEDURE — 82962 GLUCOSE BLOOD TEST: CPT

## 2021-08-03 PROCEDURE — 74011250637 HC RX REV CODE- 250/637: Performed by: EMERGENCY MEDICINE

## 2021-08-03 PROCEDURE — 80061 LIPID PANEL: CPT

## 2021-08-03 PROCEDURE — 84443 ASSAY THYROID STIM HORMONE: CPT

## 2021-08-03 PROCEDURE — 36415 COLL VENOUS BLD VENIPUNCTURE: CPT

## 2021-08-03 PROCEDURE — 65220000003 HC RM SEMIPRIVATE PSYCH

## 2021-08-03 PROCEDURE — 74011250637 HC RX REV CODE- 250/637: Performed by: PSYCHIATRY & NEUROLOGY

## 2021-08-03 PROCEDURE — 82947 ASSAY GLUCOSE BLOOD QUANT: CPT

## 2021-08-03 RX ORDER — TRAZODONE HYDROCHLORIDE 50 MG/1
50 TABLET ORAL
Status: DISCONTINUED | OUTPATIENT
Start: 2021-08-03 | End: 2021-08-04 | Stop reason: HOSPADM

## 2021-08-03 RX ORDER — FLUOXETINE HYDROCHLORIDE 20 MG/1
40 CAPSULE ORAL DAILY
Status: DISCONTINUED | OUTPATIENT
Start: 2021-08-03 | End: 2021-08-04 | Stop reason: HOSPADM

## 2021-08-03 RX ADMIN — SULFAMETHOXAZOLE AND TRIMETHOPRIM 1 TABLET: 800; 160 TABLET ORAL at 08:45

## 2021-08-03 RX ADMIN — FLUOXETINE 40 MG: 20 CAPSULE ORAL at 14:27

## 2021-08-03 RX ADMIN — SULFAMETHOXAZOLE AND TRIMETHOPRIM 1 TABLET: 800; 160 TABLET ORAL at 21:25

## 2021-08-03 RX ADMIN — TRAZODONE HYDROCHLORIDE 50 MG: 50 TABLET ORAL at 21:25

## 2021-08-03 NOTE — BH NOTES
GROUP THERAPY PROGRESS NOTE    Patient is participating in Discharge/Goals/Community Meeting Group. Group time: 50 minutes    Personal goal for participation: Process feelings related to discharge and/or feelings/goals for today. Goal orientation: Personal    Group therapy participation: active    Therapeutic interventions reviewed and discussed: Group discussion was focused on discharge plans and anxiety related to this. Group members discussed what they planned to do once discharge and discharge plans. Discussion also related to support and communication issues that arise. Group members verbalized how they are feeling today, their personal goal for today, and goals for the week. Each member shared how they are going to make the most out of their treatment teams daily. Patients were given an opportunity to share any concerns and issues they were having. The last 15 minutes of group, patient sat through a guided imagery to create a safe space to escape to when feeling anxious about their discharge or their treatment. Patients created a safe space using in their minds eye using their 5 senses. Impression of participation: [facility] actively participated in group. Patient shared her safe space was in the sea under water swimming. Pt set a goal to buy her own apartment. She did not report any questions or concerns about her discharge. Pleasant and cooperative.      Lida Melton, Supervisee in Social Work

## 2021-08-03 NOTE — INTERDISCIPLINARY ROUNDS
Behavioral Health Interdisciplinary Rounds     Patient Name: Ginger Hardin  Age: 26 y.o. Room/Bed:  730/01  Primary Diagnosis: <principal problem not specified>   Admission Status: Voluntary     Readmission within 30 days: no  Power of  in place: no  Patient requires a blocked bed: no          Reason for blocked bed:     VTE Prophylaxis: No    Mobility needs/Fall risk: no  Flu Vaccine : no   Nutritional Plan: no  Consults:          Labs/Testing due today?: yes    Sleep hours: 6         Participation in Care/Groups:  no  Medication Compliant?: Yes  PRNS (last 24 hours): None    Restraints (last 24 hours):  no     CIWA (range last 24 hours):     COWS (range last 24 hours):      Alcohol screening (AUDIT) completed -   AUDIT Score: 2     If applicable, date SBIRT discussed in treatment team AND documented:   AUDIT Screen Score: AUDIT Score: 2    Tobacco - patient is a smoker: Have You Used Tobacco in the Past 30 Days: No  Illegal Drugs use: Have You Used Any Illegal Substances Over the Past 12 Months: Yes    24 hour chart check complete: yes     Patient goal(s) for today:   Treatment team focus/goals: Plan to assess for medications and discharge needs. Plan for discharge on Wednesday   Progress note :Pleasant and compliant in treatment team.  Denies SI, she was requesting discharged, but agreed to stay over night,     LOS:  1  Expected LOS: Wednesday     Financial concerns/prescription coverage:  Medicaid   Family contact:    Reno Mortimer Parent          SW spoke to mom , who reports she has spoken to her daughter and plans to pickup tomorrow. Mother feels she is safe for discharge.       Family requesting physician contact today:  no  Discharge plan: she will return home with her mother   Access to weapons : no        Outpatient provider(s): [facility]   Patient's preferred phone number for follow up call :   Patient's preferred e-mail address :  Participating treatment team members: Lucy Garcia RN

## 2021-08-03 NOTE — BH NOTES
GROUP THERAPY PROGRESS NOTE    Patient is participating in Substance Abuse group. Group time: 50 minutes    Personal goal for participation: To identify and understand relapse triggers. Goal orientation: Personal    Group therapy participation: active    Therapeutic interventions reviewed and discussed: Group discussion on ways relapse triggers can affect individuals in recovery that encounter situations that they associate with past drug abuse. Each patient identified their own triggers, and the group discussed most common ones. Group members read aloud mistaken beliefs and myths about relapse and were given the truths behind it. Patients were encouraged to keep a checklist of behaviors and attitudes that are identified as indicators of an approaching relapse in order to interrupt the relapse dynamic and avoid it. Individuals were also given a sheet that included the most common relapse situations, these are called the The Unlucky 13, and members were to identify at least 5 of their own risks. Impression of participation: Patient was pleasant and cooperative in group. She briefly engaged in discussion and conversation because she was called to treatment team. Demonstrated understanding of group discussion.      Lida Melton, Supervisee in Social Work

## 2021-08-03 NOTE — BH NOTES
GROUP THERAPY PROGRESS NOTE    Patient is participating in Psychosocial Assessment Group. Group time: 60 minutes    Personal goal for participation: To complete a psychosocial assessment     Goal orientation: Personal    Group therapy participation: active     Therapeutic interventions reviewed and discussed: Social workers completed psychosocial assessment on patients upon admission to the unit. The goal is to gather vital information about a patient in order to best meet their needs during their stay here. Impression of participation: Patient participated in assessment.     Lida Melton, Supervisee in Social Work

## 2021-08-03 NOTE — SUICIDE SAFETY PLAN
SAFETY PLAN    A suicide Safety Plan is a document that supports someone when they are having thoughts of suicide. Warning Signs that indicate a suicidal crisis may be developing: What (situations, thoughts, feelings, body sensations, behaviors, etc.) do you experience that lets you know you are beginning to think about suicide? 1. Wanting to starve myself  2. Not doing the things I love ( playing games, doing my hair)   3. Sleeping all day and all night     Internal Coping Strategies:  What things can I do (relaxation techniques, hobbies, physical activities, etc.) to take my mind off my problems without contacting another person? 1. Playing animal crossing   2. Taking a walk   3. Exercise     People and social settings that provide distraction: Who can I call or where can I go to distract me? 1. Name: Jadon Noonan cell phone   2. Name: Clarice Strange  Phone: in cell phone   3. Place: [employer]         4. Place: another clothing store     People whom I can ask for help: Who can I call when I need help - for example, friends, family, clergy, someone else? 1. Name: Arturo Abbott                2. Name:    3. Name: Ollie Martinez or [address] [place] [address] Blvd I can contact during a crisis: Who can I call for help - for example, my doctor, my psychiatrist, my psychologist, a mental health provider, a suicide hotline? Clinician Name: Ms Estuardo Martínez NP    Phone: [address]: [phone] ([phone])     [address] [place] [address] Emergency Services -  for example, [address] [facility] suicide hotline, [facility] Hotline: 211      Emergency Services : [address] [facility]       Emergency Services Phone: [phone]     Making the environment safe: How can I make my environment (house/apartment/living space) safer? For example, can I remove guns, medications, and other items?      1. Program your local crisis number at [phone] into your phone. 2. Have your support system program your local crisis number into their phone. 3. Keep medications in a lock box. Only keep a 3 day supply available at a time. 4. Remove firearms from the house or have them secured in a safe you cannot access. 5. Keep room clean   6.  Know when to take a walk
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
## 2021-08-03 NOTE — BH NOTES
PRN Medication Documentation    Specific patient behavior that led to need for PRN medication: migraine headaches  Staff interventions attempted prior to PRN being given: PO prn motrin  PRN medication given:  mg of motrin  Patient response/effectiveness of PRN medication: will reassess within 1 hr

## 2021-08-03 NOTE — BH NOTES
GROUP THERAPY PROGRESS NOTE    Patient is participating in Treatment Team Group. Group time: 60 minutes    Personal goal for participation: To participate in treatment plan and discharge     Goal orientation: Personal    Group therapy participation: active     Therapeutic interventions reviewed and discussed: Group members met with their treatment team individually and discussed their treatment plan with their provider, , nurse, and pharmacist. Each patient was given the opportunity to ask questions related to their discharge and/or medications. Impression of participation: Patient participated in treatment team. Engaged in conversation and discussion. Asked questions about treatment plan and discharge.     Lida Melton, Supervisee in Social Work

## 2021-08-03 NOTE — PROGRESS NOTES
Problem: Depressed Mood (Adult/Pediatric)  Goal: *STG: Participates in treatment plan  Outcome: Progressing Towards Goal  Goal: *STG: Attends activities and groups  Outcome: Progressing Towards Goal  Goal: *STG: Complies with medication therapy  Outcome: Progressing Towards Goal     Problem: Patient Education: Go to Patient Education Activity  Goal: Patient/Family Education  Outcome: Progressing Towards Goal    Patient awake and alert and present in the milieu. Interacts well with staff and peers. Med and meal compliant. Will continue to monitor q15 minutes for safety checks.      [facility]  Master Treatment Plan for [address] [facility]    Date Treatment Plan Initiated: 08/03/2021    Treatment Plan Modalities:  Type of Modality Amount  (x minutes) Frequency (x/week) Duration (x days) Name of Responsible Staff   Community & wrap-up meetings to encourage peer interactions 15 7 1 Gal Pichardo psychotherapy to assist in building coping skills and internal controls 60 7 1 Lida Melton   Therapeutic activity groups to build coping skills 60 7 1 Lida Melton   Psychoeducation in group setting to address:   Medication education   Davin Friedman 42. PharmD   Coping skills   30 3 1 Lida Melton   Relaxation techniques      ARNALDO Pichardo   Symptom management      Zia George RN   Discharge planning   60 2 [address]   Spirituality    60 2 1 edward RAM   61 1 1 volunteer   Recovery/AA/NA      volunteer   Physician medication management   15 7 1    Family meeting/discharge planning   15 2 [address] [facility] and light

## 2021-08-03 NOTE — BH NOTES
PSYCHOSOCIAL ASSESSMENT  :Patient identifying info:   Maryuri Jarvis is a 26 y.o., female admitted 2021  4:32 PM     Presenting problem and precipitating factors: Patient was admitted to the ED for SI with plan to starve herself. Precipitating factors include argument with brother and mother. Engages in self-harm via scratching, cutting, and running into objects. Endorsed AH and VH- shadows. Pt has a hx of suicide attempt in 2017 by starving herself until she got to 79lbs. No recent hospitalization. Mental status assessment: alert, oriented , x's 3 , pleasant and cooperative,     Strengths: stable housing; outpatient provider     Collateral information: mother     Current psychiatric /substance abuse providers and contact info: appt with Dr. Thomas Joel 2321     Previous psychiatric/substance abuse providers and response to treatment: first admission      Family history of mental illness or substance abuse: none noted     Substance abuse history:    Social History     Tobacco Use    Smoking status: Never Smoker    Smokeless tobacco: Never Used   Substance Use Topics    Alcohol use: Yes     Comment: social       History of biomedical complications associated with substance abuse: none noted     Patient's current acceptance of treatment or motivation for change: voluntary admission     Family constellation: pt is single and has no children     Is significant other involved?  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

## 2021-08-03 NOTE — PROGRESS NOTES
Problem: Discharge Planning  Goal: *Discharge to safe environment  Outcome: Progressing Towards Goal  Note: She will return home with family   She has supportive family   She has an established outpatient provider   Goal: *Knowledge of medication management  Outcome: Progressing Towards Goal  Note: She is willing to take medications   Goal: *Knowledge of discharge instructions  Outcome: Progressing Towards Goal  Note: She is able to verbalize discharge instructions      Problem: Discharge Planning  Goal: *Discharge to safe environment  Outcome: Progressing Towards Goal  Note: She will return home with family   She has supportive family   She has an established outpatient provider   Goal: *Knowledge of medication management  Outcome: Progressing Towards Goal  Note: She is willing to take medications   Goal: *Knowledge of discharge instructions  Outcome: Progressing Towards Goal  Note: She is able to verbalize discharge instructions

## 2021-08-03 NOTE — PROGRESS NOTES
Problem: Falls - Risk of  Goal: *Absence of Falls  Description: Document Saurabh Nunezbrittni Fall Risk and appropriate interventions in the flowsheet. Outcome: Progressing Towards Goal  Note: Fall Risk Interventions:    Problem: Depressed Mood (Adult/Pediatric)  Goal: *STG: Participates in treatment plan  Outcome: Progressing Towards Goal     Received patient asleep,breathing even and unlabored. Lights dim and [id number] checks maintained for safety.

## 2021-08-04 VITALS
DIASTOLIC BLOOD PRESSURE: 87 MMHG | TEMPERATURE: 98.6 F | HEIGHT: 61 IN | WEIGHT: 194 LBS | BODY MASS INDEX: 36.63 KG/M2 | RESPIRATION RATE: 16 BRPM | SYSTOLIC BLOOD PRESSURE: 136 MMHG | HEART RATE: 99 BPM | OXYGEN SATURATION: 99 %

## 2021-08-04 PROCEDURE — 74011250637 HC RX REV CODE- 250/637: Performed by: NURSE PRACTITIONER

## 2021-08-04 PROCEDURE — 74011250637 HC RX REV CODE- 250/637: Performed by: EMERGENCY MEDICINE

## 2021-08-04 PROCEDURE — 74011250637 HC RX REV CODE- 250/637: Performed by: PSYCHIATRY & NEUROLOGY

## 2021-08-04 RX ORDER — FLUOXETINE HYDROCHLORIDE 40 MG/1
40 CAPSULE ORAL DAILY
Qty: 30 CAPSULE | Refills: 0 | Status: SHIPPED | OUTPATIENT
Start: 2021-08-04

## 2021-08-04 RX ORDER — SULFAMETHOXAZOLE AND TRIMETHOPRIM 800; 160 MG/1; MG/1
1 TABLET ORAL 2 TIMES DAILY
Qty: 6 TABLET | Refills: 0 | Status: SHIPPED | OUTPATIENT
Start: 2021-08-04 | End: 2022-05-18 | Stop reason: ALTCHOICE

## 2021-08-04 RX ORDER — MIRTAZAPINE 15 MG/1
15 TABLET, FILM COATED ORAL
Qty: 30 TABLET | Refills: 0 | Status: SHIPPED | OUTPATIENT
Start: 2021-08-04 | End: 2022-04-01

## 2021-08-04 RX ORDER — TRAZODONE HYDROCHLORIDE 50 MG/1
50 TABLET ORAL
Qty: 30 TABLET | Refills: 0 | Status: SHIPPED | OUTPATIENT
Start: 2021-08-04 | End: 2022-04-01

## 2021-08-04 RX ADMIN — FLUOXETINE 40 MG: 20 CAPSULE ORAL at 10:11

## 2021-08-04 RX ADMIN — MIRTAZAPINE 15 MG: 15 TABLET, FILM COATED ORAL at 01:55

## 2021-08-04 RX ADMIN — SULFAMETHOXAZOLE AND TRIMETHOPRIM 1 TABLET: 800; 160 TABLET ORAL at 10:11

## 2021-08-04 NOTE — BH NOTES
GROUP THERAPY PROGRESS NOTE    Patient is participating in [address] and Wellness Education Group. Group time: 50 minutes    Personal goal for participation: To build a positive self-image     Goal orientation: Personal    Group therapy participation: active    Therapeutic interventions reviewed and discussed: Group members completed a positive self-talk worksheet to identify their strengths and see their positive qualities. Using the strengths-perspective, participants were to complete a worksheet to understand how their strengths play a role in different areas of their life (relationships, professional life, and personal fulfillment). Clients will think about ways in which they currently use their strengths, along with new ways they could begin using them. Each member was given a self-esteem journal to complete for a week. Impression of participation: Adan Sanford actively participated in group. Patient was pleasant and cooperative. She is supportive of other group members, pt attempted to encourage and help another patient appropriately. Completed the activity and processed her strengths in group.      Lida Melton, Supervisee in Social Work

## 2021-08-04 NOTE — BH NOTES
Pt discharged with her family. No distress noted. Denies any thoughts of self harm. All belongings returned to pt. Discharge instructions reviewed and signed with pt.

## 2021-08-04 NOTE — PROGRESS NOTES
Problem: Falls - Risk of  Goal: *Absence of Falls  Description: Document Eric Chapman Fall Risk and appropriate interventions in the flowsheet. Outcome: Progressing Towards Goal  Note: Fall Risk Interventions:            Medication Interventions: Teach patient to arise slowly                   Problem: Patient Education: Go to Patient Education Activity  Goal: Patient/Family Education  Outcome: Progressing Towards Goal     Problem: Depressed Mood (Adult/Pediatric)  Goal: *STG: Participates in treatment plan  Outcome: Progressing Towards Goal  Note: Pt participated in treatment team. Out on the unit and interacting with peers appropriately. No thoughts of self harm. Attending groups on the unit. Plan for discharge today.    Goal: Interventions  Outcome: Progressing Towards Goal     Problem: Patient Education: Go to Patient Education Activity  Goal: Patient/Family Education  Outcome: Progressing Towards Goal

## 2021-08-04 NOTE — BH NOTES
GROUP THERAPY PROGRESS NOTE    Patient is participating in Goals Setting/Community Meeting Group. Group time: 45 minutes    Personal goal for participation: Review progress made towards individual goals for IP treatment. Celebrate success, identify barriers and discuss strategies to continue progress towards achieving IP treatment goals. Goal orientation: Personal    Group therapy participation: active    Therapeutic interventions reviewed and discussed: Group members were engaged in conversation about their goals for IP treatment, their success, any perceived barriers and how to utilize their resources and treatment team to continue working to achieve their goals. Members were encouraged to celebrate any success or progress as a method to maintain motivation and drive to continue working on goals. Impression of participation: Lior Bess was alert, oriented, and calm. She was in a good mood and focused on her discharge today. She shared he was proud of herself for seeking IP Tx to keep herself safe and plans on working with her OP Tx team to monitor her [address] crisis warning signs and practice coping skills for early intervention.        ANA Pillai, Supervisee in Social Work

## 2021-08-04 NOTE — INTERDISCIPLINARY ROUNDS
Behavioral Health Interdisciplinary Rounds     Patient Name: Jairo Finn  Age: 26 y.o. Room/Bed:  725/  Primary Diagnosis: <principal problem not specified>   Admission Status: Voluntary     Readmission within 30 days: no  Power of  in place: no  Patient requires a blocked bed: no          Reason for blocked bed:     VTE Prophylaxis: No    Mobility needs/Fall risk: no  Flu Vaccine : no   Nutritional Plan: no  Consults:          Labs/Testing due today?: no    Sleep hours:  5.5      Participation in Care/Groups:  yes  Medication Compliant?: Yes  PRNS (last 24 hours): None    Restraints (last 24 hours):  no     CIWA (range last 24 hours):     COWS (range last 24 hours):      Alcohol screening (AUDIT) completed -   AUDIT Score: 2     If applicable, date SBIRT discussed in treatment team AND documented:   AUDIT Screen Score: AUDIT Score: 2      Tobacco - patient is a smoker: Have You Used Tobacco in the Past 30 Days: No  Illegal Drugs use: Have You Used Any Illegal Substances Over the Past 12 Months: Yes    24 hour chart check complete: yes     Patient goal(s) for today:   Treatment team focus/goals: Plan for discharge today. Progress note : she denies SI. She has been pleasnat and compliant with her treatment     LOS:  2  Expected LOS: today     Financial concerns/prescription coverage: medicaid   Family contact:  mother -      Family requesting physician contact today:   Discharge plan:  She will return home when ready for discharge   Access to weapons :  No      Outpatient provider(s): [facility]   Patient's preferred phone number for follow up call :   Patient's preferred e-mail address :  Participating treatment team members: Dr. Sosa Hernandez

## 2021-08-04 NOTE — PROGRESS NOTES
Pharmacist Discharge Medication Reconciliation    Discharging Provider: Dr. Silvana Huff PMH:   Past Medical History:   Diagnosis Date    Anxiety     Chronic pain     Depression     Endometriosis     Headache     Lupus ([address] 2017    Migraines     Patella-femoral syndrome      Chief Complaint for this Admission:   Chief Complaint   Patient presents with    Mental Health Problem     Allergies: Pcn [penicillins] and Plaquenil [hydroxychloroquine]    Discharge Medications:   Current Discharge Medication List        START taking these medications    Details   trimethoprim-sulfamethoxazole (BACTRIM DS, SEPTRA DS) 160-800 mg per tablet Take 1 Tablet by mouth two (2) times a day. Indications: urinary tract infection due to E. coli bacteria  Qty: 6 Tablet, Refills: 0  Start date: 8/4/2021      traZODone (DESYREL) 50 mg tablet Take 1 Tablet by mouth nightly. Indications: insomnia associated with depression  Qty: 30 Tablet, Refills: 0  Start date: 8/4/2021           CONTINUE these medications which have CHANGED    Details   FLUoxetine (PROzac) 40 mg capsule Take 1 Capsule by mouth daily. Indications: major depressive disorder  Qty: 30 Capsule, Refills: 0  Start date: 8/4/2021      mirtazapine (REMERON) 15 mg tablet Take 1 Tablet by mouth nightly.  Indications: major depressive disorder  Qty: 30 Tablet, Refills: 0  Start date: 8/4/2021           STOP taking these medications       ibuprofen (Motrin IB) 200 mg tablet Comments:   Reason for Stopping:         dicyclomine (BENTYL) 10 mg capsule Comments:   Reason for Stopping:         ACETAMINOPHEN (TYLENOL EXTRA STRENGTH PO) Comments:   Reason for Stopping:             The patient's chart, MAR and AVS were reviewed by Ruben Loweyr, PHARMD.

## 2021-08-04 NOTE — DISCHARGE INSTRUCTIONS
DISCHARGE SUMMARY    NAME:Deedee BUSTILLOS [address] [place] [address]  : 1995  MRN: [id number]    The patient Giulia Ma exhibits the ability to control behavior in a less restrictive environment. Patient's level of functioning is improving. No assaultive/destructive behavior has been observed for the past 24 hours. No suicidal/homicidal threat or behavior has been observed for the past 24 hours. There is no evidence of serious medication side effects. Patient has not been in physical or protective restraints for at least the past 24 hours. If weapons involved, how are they secured? No weapons involved    Is patient aware of and in agreement with discharge plan? She is aware of discharge and is in agreement     Arrangements for medication:  Prescriptions sent to NIDA Ludwig at [facility] of discharge instructions to provider?:  Fax to [employer] [address] [employer] for transportation home:  Mom to      Keep all follow up appointments as scheduled, continue to take prescribed medications per physician instructions. Mental health crisis number:  [phone] or your local mental health crisis line number at [phone]. DISCHARGE SUMMARY from Nurse    PATIENT INSTRUCTIONS:      What to do at Home:  Recommended activity: Activity as tolerated. *  Please give a list of your current medications to your Primary Care Provider. *  Please update this list whenever your medications are discontinued, doses are      changed, or new medications (including over-the-counter products) are added. *  Please carry medication information at all times in case of emergency situations. These are general instructions for a healthy lifestyle:    No smoking/ No tobacco products/ Avoid exposure to second hand smoke  Surgeon General's Warning:  Quitting smoking now greatly reduces serious risk to your health.     Obesity, smoking, and sedentary lifestyle greatly increases your risk for illness    A healthy diet, regular physical exercise & weight monitoring are important for maintaining a healthy lifestyle    You may be retaining fluid if you have a history of heart failure or if you experience any of the following symptoms:  Weight gain of 3 pounds or more overnight or 5 pounds in a week, increased swelling in our hands or feet or shortness of breath while lying flat in bed. Please call your doctor as soon as you notice any of these symptoms; do not wait until your next office visit. The discharge information has been reviewed with the patient. The patient verbalized understanding. Discharge medications reviewed with the patient and appropriate educational materials and side effects teaching were provided.   ___________________________________________________________________________________________________________________________________      Mental Health Emergency WARM LINE      [phone])      M-F: 9am to 9pm      Sat & Sun: 5pm - 9pm  National suicide prevention lines:                             3-411-RSPDGOB ([phone])       1-964-329-TALK ([phone])   24/7 Crisis Text Line:  Text HOME to [phone]

## 2021-08-04 NOTE — BH NOTES
Behavioral Health Transition Record to Provider    Patient Name: Jolly Dowd  YOB: 1995  Medical Record Number: [id number]  Date of Admission: 8/2/2021  Date of Discharge: 8/4/2021    Attending Provider: No att. providers found  Discharging Provider: Shea Thakur MD  To contact this individual call [phone] and ask the  to page. If unavailable, ask to be transferred to [facility] Provider on call. [facility] Provider will be available on call 24/7 and during holidays. Primary Care Provider: Ayaka Salinas DO    Allergies   Allergen Reactions    Pcn [Penicillins] Hives    Plaquenil [Hydroxychloroquine] Rash     Rash and oral ulcers       Reason for Admission: CHIEF COMPLAINT:  \"I feel okay today. \"     HISTORY OF PRESENT ILLNESS:  The patient is a 61-year-old [place] American female who is currently admitted after she presented to the ER requesting help for thoughts of suicide. Karma Ohara had reported that she had an argument with her mother and feels that the mother is overly critical of her and treats her like a punching bag.  States that they had a loud argument yesterday after which the patient felt upset.  Her brother then texted her and told her that she had behaved very selfishly and was critical of her also.  Reports that she started having thoughts of suicide and started thinking about starving herself to death.  States that yesterday in the ER her brother called her and he was again belligerent with her and she then scratched herself in multiple places in the ER. Karma Ohara has a history of self-harm going back to about 6 years. Ethyl Begin that she has been sleeping poorly and has had little energy or motivation.  Reports that she uses THC which is prescribed by a clinician once a week, but her urine drug screen is negative.  An additional stressor has been their recent decision to move from the house they are living in because her parents are going through a divorce and they have to sell the house. Elenita Amezquita have struggled to find alternative housing and may have to move into a hotel which the patient dreads doing.  Denies any auditory or visual hallucinations.  Denies regular or heavy use of alcohol.  Urine drug screen was negative    Admission Diagnosis: Acute respiratory distress in  with surfactant disorder [P22.0]    * No surgery found *    Results for orders placed or performed during the hospital encounter of 21   URINE CULTURE HOLD SAMPLE    Specimen: Serum; Urine   Result Value Ref Range    Urine culture hold        Urine on hold in Microbiology dept for 2 days. If unpreserved urine is submitted, it cannot be used for addtional testing after 24 hours, recollection will be required. CULTURE, URINE    Specimen: Clean catch; Urine   Result Value Ref Range    Special Requests: NO SPECIAL REQUESTS      [facility] Count >100,000  COLONIES/mL        Culture result: (A)       POSSIBLE STAPHYLOCOCCUS SPECIES, COAGULASE NEGATIVE   CBC WITH AUTOMATED DIFF   Result Value Ref Range    WBC 7.7 3.6 - 11.0 K/uL    RBC 4.87 3.80 - 5.20 M/uL    HGB 12.8 11.5 - 16.0 g/dL    HCT 40.2 35.0 - 47.0 %    MCV 82.5 80.0 - 99.0 FL    MCH 26.3 26.0 - 34.0 PG    MCHC 31.8 30.0 - 36.5 g/dL    RDW 13.3 11.5 - 14.5 %    PLATELET 681 655 - 120 K/uL    MPV 11.0 8.9 - 12.9 FL    NRBC 0.0 0  WBC    ABSOLUTE NRBC 0.00 0.00 - 0.01 K/uL    NEUTROPHILS 76 (H) 32 - 75 %    LYMPHOCYTES 18 12 - 49 %    MONOCYTES 4 (L) 5 - 13 %    EOSINOPHILS 1 0 - 7 %    BASOPHILS 1 0 - 1 %    IMMATURE GRANULOCYTES 0 0.0 - 0.5 %    ABS. NEUTROPHILS 5.8 1.8 - 8.0 K/UL    ABS. LYMPHOCYTES 1.4 0.8 - 3.5 K/UL    ABS. MONOCYTES 0.3 0.0 - 1.0 K/UL    ABS. EOSINOPHILS 0.1 0.0 - 0.4 K/UL    ABS. BASOPHILS 0.1 0.0 - 0.1 K/UL    ABS. IMM.  GRANS. 0.0 0.00 - 0.04 K/UL    DF AUTOMATED     METABOLIC PANEL, COMPREHENSIVE   Result Value Ref Range    Sodium 138 136 - 145 mmol/L    Potassium 4.5 3.5 - 5.1 mmol/L Chloride 106 97 - 108 mmol/L    CO2 25 21 - 32 mmol/L    Anion gap 7 5 - 15 mmol/L    Glucose 93 65 - 100 mg/dL    BUN 12 6 - 20 MG/DL    Creatinine 0.81 0.55 - 1.02 MG/DL    BUN/Creatinine ratio 15 12 - 20      GFR est AA >60 >60 ml/min/1.73m2    GFR est non-AA >60 >60 ml/min/1.73m2    Calcium 9.7 8.5 - 10.1 MG/DL    Bilirubin, total 0.2 0.2 - 1.0 MG/DL    ALT (SGPT) 25 12 - 78 U/L    AST (SGOT) 14 (L) 15 - 37 U/L    Alk. phosphatase 114 45 - 117 U/L    Protein, total 8.7 (H) 6.4 - 8.2 g/dL    Albumin 4.0 3.5 - 5.0 g/dL    Globulin 4.7 (H) 2.0 - 4.0 g/dL    A-G Ratio 0.9 (L) 1.1 - 2.2     SAMPLES BEING HELD   Result Value Ref Range    SAMPLES BEING HELD 1RED     COMMENT        Add-on orders for these samples will be processed based on acceptable specimen integrity and analyte stability, which may vary by analyte.    URINALYSIS W/MICROSCOPIC   Result Value Ref Range    Color YELLOW/STRAW      Appearance TURBID (A) CLEAR      Specific gravity 1.024 1.003 - 1.030      pH (UA) 5.0 5.0 - 8.0      Protein Negative NEG mg/dL    Glucose Negative NEG mg/dL    Ketone TRACE (A) NEG mg/dL    Bilirubin Negative NEG      Blood Negative NEG      Urobilinogen 0.2 0.2 - 1.0 EU/dL    Nitrites Negative NEG      Leukocyte Esterase MODERATE (A) NEG      WBC 20-50 0 - 4 /hpf    RBC 0-5 0 - 5 /hpf    Epithelial cells MODERATE (A) FEW /lpf    Bacteria 2+ (A) NEG /hpf   DRUG SCREEN, URINE   Result Value Ref Range    AMPHETAMINES Negative NEG      BARBITURATES Negative NEG      BENZODIAZEPINES Negative NEG      COCAINE Negative NEG      METHADONE Negative NEG      OPIATES Negative NEG      PCP(PHENCYCLIDINE) Negative NEG      THC (TH-CANNABINOL) Negative NEG      Drug screen comment (NOTE)    COVID-19 WITH INFLUENZA A/B   Result Value Ref Range    SARS-CoV-2 Not detected NOTD      Influenza A by PCR Not detected NOTD      Influenza B by PCR Not detected NOTD     ETHYL ALCOHOL   Result Value Ref Range    ALCOHOL(ETHYL),SERUM <10 <10 MG/DL GLUCOSE, FASTING   Result Value Ref Range    Glucose 83 65 - 100 MG/DL   TSH 3RD GENERATION   Result Value Ref Range    TSH 2.14 0.36 - 3.74 uIU/mL   LIPID PANEL   Result Value Ref Range    Cholesterol, total 184 <200 MG/DL    Triglyceride 120 <150 MG/DL    HDL Cholesterol 58 MG/DL    LDL, calculated 102 (H) 0 - 100 MG/DL    VLDL, calculated 24 MG/DL    CHOL/HDL Ratio 3.2 0.0 - 5.0     HCG URINE, QL. - POC   Result Value Ref Range    Pregnancy test,urine (POC) Negative NEG     GLUCOSE, POC   Result Value Ref Range    Glucose (POC) 103 65 - 117 mg/dL    Performed by Lizette Mcclendon        Immunizations administered during this encounter: There is no immunization history on file for this patient. Screening for Metabolic Disorders for Patients on Antipsychotic Medications  (Data obtained from the EMR)    Estimated Body Mass Index  Estimated body mass index is 36.66 kg/m² as calculated from the following:    Height as of this encounter: 5' 1\" (1.549 m). Weight as of this encounter: 88 kg (194 lb).      Vital Signs/Blood Pressure  Visit Vitals  /87   Pulse 99   Temp 98.6 °F (37 °C)   Resp 16   Ht 5' 1\" (1.549 m)   Wt 88 kg (194 lb)   SpO2 99%   BMI 36.66 kg/m²       Blood Glucose/Hemoglobin A1c  Lab Results   Component Value Date/Time    Glucose 83 08/03/2021 06:13 AM    Glucose (POC) 103 08/03/2021 12:48 PM       Lab Results   Component Value Date/Time    Hemoglobin A1c 5.9 (H) 01/07/2021 11:49 AM        Lipid Panel  Lab Results   Component Value Date/Time    Cholesterol, total 184 08/03/2021 06:13 AM    HDL Cholesterol 58 08/03/2021 06:13 AM    LDL,Direct 102 (H) 01/07/2021 11:49 AM    LDL, calculated 102 (H) 08/03/2021 06:13 AM    Triglyceride 120 08/03/2021 06:13 AM    CHOL/HDL Ratio 3.2 08/03/2021 06:13 AM        Discharge Diagnosis:  Recurrent major depression, 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 The patient Emilia Daniels exhibits the ability to control behavior in a less restrictive environment. Patient's level of functioning is improving. No assaultive/destructive behavior has been observed for the past 24 hours. No suicidal/homicidal threat or behavior has been observed for the past 24 hours. There is no evidence of serious medication side effects. Patient has not been in physical or protective restraints for at least the past 24 hours. If weapons involved, how are they secured? No weapons involved    Is patient aware of and in agreement with discharge plan? She is aware of discharge and is in agreement     Arrangements for medication:  Prescriptions sent to NIDA Ludwig at [facility] of discharge instructions to provider?:  Fax to [address]  for transportation home:  Mom to      Keep all follow up appointments as scheduled, continue to take prescribed medications per physician instructions. Mental health crisis number:  [phone] or your local mental health crisis line number at [phone]. Discharge Medication List and Instructions:   Discharge Medication List as of 8/4/2021 11:25 AM      START taking these medications    Details   trimethoprim-sulfamethoxazole (BACTRIM DS, SEPTRA DS) 160-800 mg per tablet Take 1 Tablet by mouth two (2) times a day. Indications: urinary tract infection due to E. coli bacteria, Normal, Disp-6 Tablet, R-0      traZODone (DESYREL) 50 mg tablet Take 1 Tablet by mouth nightly. Indications: insomnia associated with depression, Normal, Disp-30 Tablet, R-0         CONTINUE these medications which have CHANGED    Details   FLUoxetine (PROzac) 40 mg capsule Take 1 Capsule by mouth daily. Indications: major depressive disorder, Normal, Disp-30 Capsule, R-0      mirtazapine (REMERON) 15 mg tablet Take 1 Tablet by mouth nightly.  Indications: major depressive disorder, Normal, Disp-30 Tablet, R-0         STOP taking these medications       ibuprofen (Motrin IB) 200 mg tablet Comments:   Reason for Stopping:         dicyclomine (BENTYL) 10 mg capsule Comments:   Reason for Stopping:         ACETAMINOPHEN (TYLENOL EXTRA STRENGTH PO) Comments:   Reason for Stopping:               Unresulted Labs (24h ago, onward)    None        To obtain results of studies pending at discharge, please contact [phone]    Follow-up Information     Follow up With Specialties Details Why [address]   On 8/23/2021 you have a 4:00 in person appointment with Ms. Marychuy Mcleod, IRON  [address] [facility], [id number]  [address]1 C/Jimmy Barnett [facility], [place], [address]  [address] [place] [address] 21             Advanced Directive:   Does the patient have an appointed surrogate decision maker? No  Does the patient have a Medical Advance Directive? No  Does the patient have a Psychiatric Advance Directive? No  If the patient does not have a surrogate or Medical Advance Directive AND Psychiatric Advance Directive, the patient was offered information on these advance directives Patient declined to complete    Patient Instructions: Please continue all medications until otherwise directed by physician. Tobacco Cessation Discharge Plan:   Is the patient a smoker and needs referral for smoking cessation? No  Patient referred to the following for smoking cessation with an appointment? Not applicable     Patient was offered medication to assist with smoking cessation at discharge? Not applicable  Was education for smoking cessation added to the discharge instructions? Yes    Alcohol/Substance Abuse Discharge Plan:   Does the patient have a history of substance/alcohol abuse and requires a referral for treatment? No  Patient referred to the following for substance/alcohol abuse treatment with an appointment?  Not applicable  Patient was offered medication to assist with alcohol cessation at discharge? Not applicable  Was education for substance/alcohol abuse added to discharge instructions? No    Patient discharged to Home; discussed with patient/caregiver and provided to the patient/caregiver either in hard copy or electronically.

## 2021-08-04 NOTE — PROGRESS NOTES
Problem: Falls - Risk of  Goal: *Absence of Falls  Description: Document [place] Fall Risk and appropriate interventions in the flowsheet.   Outcome: Progressing Towards Goal  Note: Fall Risk Interventions:            Problem: Depressed Mood (Adult/Pediatric)  Goal: *STG: Remains safe in hospital  Outcome: Progressing Towards Goal
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
## 2021-08-05 LAB
BACTERIA SPEC CULT: ABNORMAL
CC UR VC: ABNORMAL
SERVICE CMNT-IMP: ABNORMAL

## 2021-08-06 NOTE — PROGRESS NOTES
Reached out to patient at [phone] for follow up. Voicemail came on and no message left due to confidentiality.

## 2021-11-09 ENCOUNTER — OFFICE VISIT (OUTPATIENT)
Dept: PRIMARY CARE CLINIC | Age: 26
End: 2021-11-09
Payer: MEDICAID

## 2021-11-09 VITALS
BODY MASS INDEX: 40.59 KG/M2 | SYSTOLIC BLOOD PRESSURE: 124 MMHG | HEIGHT: 61 IN | RESPIRATION RATE: 17 BRPM | WEIGHT: 215 LBS | HEART RATE: 98 BPM | DIASTOLIC BLOOD PRESSURE: 77 MMHG | OXYGEN SATURATION: 98 % | TEMPERATURE: 97.6 F

## 2021-11-09 DIAGNOSIS — J32.9 CHRONIC SINUSITIS, UNSPECIFIED LOCATION: ICD-10-CM

## 2021-11-09 DIAGNOSIS — G43.709 CHRONIC MIGRAINE WITHOUT AURA WITHOUT STATUS MIGRAINOSUS, NOT INTRACTABLE: Primary | ICD-10-CM

## 2021-11-09 PROCEDURE — 99214 OFFICE O/P EST MOD 30 MIN: CPT | Performed by: FAMILY MEDICINE

## 2021-11-09 RX ORDER — SUMATRIPTAN 50 MG/1
TABLET, FILM COATED ORAL
Qty: 9 TABLET | Refills: 1 | Status: SHIPPED | OUTPATIENT
Start: 2021-11-09 | End: 2021-11-09

## 2021-11-09 RX ORDER — IBUPROFEN 600 MG/1
600 TABLET ORAL
COMMUNITY

## 2021-11-09 RX ORDER — NORETHINDRONE ACETATE AND ETHINYL ESTRADIOL 1MG-20(21)
KIT ORAL
COMMUNITY
Start: 2021-10-06

## 2021-11-09 NOTE — PATIENT INSTRUCTIONS
Migraine Headache: Care Instructions  Overview     Migraines are painful, throbbing headaches that often start on one side of the head. They may cause nausea and vomiting and make you sensitive to light, sound, or smell. Without treatment, migraines can last from 4 hours to a few days. Medicines can help prevent migraines or stop them after they have started. Your doctor can help you find which ones work best for you. Follow-up care is a key part of your treatment and safety. Be sure to make and go to all appointments, and call your doctor if you are having problems. It's also a good idea to know your test results and keep a list of the medicines you take. How can you care for yourself at home? · Do not drive if you have taken a prescription pain medicine. · Rest in a quiet, dark room until your headache is gone. Close your eyes, and try to relax or go to sleep. Don't watch TV or read. · Put a cold, moist cloth or cold pack on the painful area for 10 to 20 minutes at a time. Put a thin cloth between the cold pack and your skin. · Use a warm, moist towel or a heating pad set on low to relax tight shoulder and neck muscles. · Have someone gently massage your neck and shoulders. · Take your medicines exactly as prescribed. Call your doctor if you think you are having a problem with your medicine. You will get more details on the specific medicines your doctor prescribes. · Don't take medicine for headache pain too often. Talk to your doctor if you are taking medicine more than 2 days a week to stop a headache. Taking too much pain medicine can lead to more headaches. These are called medicine-overuse headaches. To prevent migraines  · Keep a headache diary so you can figure out what triggers your headaches. Avoiding triggers may help you prevent headaches. Record when each headache began, how long it lasted, and what the pain was like.  Write down any other symptoms you had with the headache, such as nausea, flashing lights or dark spots, or sensitivity to bright light or loud noise. Note if the headache occurred near your period. List anything that might have triggered the headache. Triggers may include certain foods (chocolate, cheese, wine) or odors, smoke, bright light, stress, or lack of sleep. · If your doctor has prescribed medicine for your migraines, take it as directed. You may have medicine that you take only when you get a migraine and medicine that you take all the time to help prevent migraines. ? If your doctor has prescribed medicine for when you get a headache, take it at the first sign of a migraine, unless your doctor has given you other instructions. ? If your doctor has prescribed medicine to prevent migraines, take it exactly as prescribed. Call your doctor if you think you are having a problem with your medicine. · Find healthy ways to deal with stress. Migraines are most common during or right after stressful times. Try finding ways to reduce stress like practicing mindfulness or deep breathing exercises. · Get plenty of sleep and exercise. But be careful to not push yourself too hard during exercise. It may trigger a headache. · Eat meals on a regular schedule. Avoid foods and drinks that often trigger migraines. These include chocolate, alcohol (especially red wine and port), aspartame, monosodium glutamate (MSG), and some additives found in foods (such as hot dogs, cee, cold cuts, aged cheeses, and pickled foods). · Limit caffeine. Don't drink too much coffee, tea, or soda. But don't quit caffeine suddenly. That can also give you migraines. · Do not smoke or allow others to smoke around you. If you need help quitting, talk to your doctor about stop-smoking programs and medicines. These can increase your chances of quitting for good. · If you are taking birth control pills or hormone therapy, talk to your doctor about whether they are triggering your migraines.   When should you call for help? Call 911 anytime you think you may need emergency care. For example, call if:    · You have signs of a stroke. These may include:  ? Sudden numbness, paralysis, or weakness in your face, arm, or leg, especially on only one side of your body. ? Sudden vision changes. ? Sudden trouble speaking. ? Sudden confusion or trouble understanding simple statements. ? Sudden problems with walking or balance. ? A sudden, severe headache that is different from past headaches. Call your doctor now or seek immediate medical care if:    · You have new or worse nausea and vomiting.     · You have a new or higher fever.     · Your headache gets much worse. Watch closely for changes in your health, and be sure to contact your doctor if:    · You are not getting better after 2 days (48 hours). Where can you learn more? Go to http://albina-cruz.info/  Enter B751 in the search box to learn more about \"Migraine Headache: Care Instructions. \"  Current as of: April 8, 2021               Content Version: 13.0  © 2006-2021 [employer]. Care instructions adapted under license by [employer] (which disclaims liability or warranty for this information).  If you have questions about a medical condition or this instruction, always ask your healthcare professional. [address] any warranty or liability for your use of this information.

## 2021-11-09 NOTE — PROGRESS NOTES
HPI     Chief Complaint   Patient presents with    Migraine     2-3 weeks have been happening daily; increasingly worse as the day goes on     She is a 23 y.o. female who presents for migraines. States she has been having migraines on and off since childhood. States they have been getting worse in last 2-3 weeks. States she wakes up with a migraine and as the day progresses it gets worse. No neuro changes with it. Sensitivity to light, sound, sometimes get nauseated. No floaters or aura symptoms. Has been taking Motrin, Tylenol. Has strong family hx of migraines. Review of Systems   Constitutional: Negative for chills and fever. Neurological: Positive for headaches. Negative for facial asymmetry and speech difficulty. Reviewed PmHx, RxHx, FmHx, SocHx, AllgHx and updated and dated in the chart. Physical Exam:  Visit Vitals  /77 (BP 1 Location: Right upper arm, BP Patient Position: Sitting, BP Cuff Size: Adult)   Pulse 98   Temp 97.6 °F (36.4 °C) (Temporal)   Resp 17   Ht 5' 1\" (1.549 m)   Wt 215 lb (97.5 kg)   LMP 09/08/2021 (Approximate)   SpO2 98%   BMI 40.62 kg/m²     Physical Exam  Vitals and nursing note reviewed. Constitutional:       General: She is not in acute distress. Appearance: Normal appearance. She is not ill-appearing. Cardiovascular:      Rate and Rhythm: Normal rate and regular rhythm. Heart sounds: No murmur heard. Pulmonary:      Effort: Pulmonary effort is normal. No respiratory distress. Breath sounds: Normal breath sounds. Neurological:      General: No focal deficit present. Mental Status: She is alert. Mental status is at baseline. Cranial Nerves: No cranial nerve deficit. Sensory: No sensory deficit. Motor: No weakness.       Coordination: Coordination normal.      Gait: Gait normal.      Deep Tendon Reflexes: Reflexes normal.   Psychiatric:         Mood and Affect: Mood normal.         Behavior: Behavior normal. No results found for this or any previous visit (from the past 12 hour(s)). Assessment / Plan     Diagnoses and all orders for this visit:    1. Chronic migraine without aura without status migrainosus, not intractable  -     CBC W/O DIFF; Future  -     METABOLIC PANEL, COMPREHENSIVE; Future  -     VITAMIN D, 25 HYDROXY; Future  -     MAGNESIUM; Future  -     ubrogepant Dimitrytae Caal) 50 mg tablet; Take 1 Tablet by mouth daily as needed for Migraine. 2. Chronic sinusitis, unspecified location  -     REFERRAL TO ENT-OTOLARYNGOLOGY      We reviewed side effects of triptans but she would prefer to try Ubrelvy given she had concerns about triptan side effects. Discussed possible side effects of Ubrelvy. I have discussed the diagnosis with the patient and the intended plan as seen in the above orders. The patient has received an after-visit summary and questions were answered concerning future plans. I have discussed medication side effects and warnings with the patient as well.     Cathi Jim, DO

## 2021-11-09 NOTE — PROGRESS NOTES
Identified pt with two pt identifiers(name and ). Chief Complaint   Patient presents with    Migraine     2-3 weeks have been happening daily; increasingly worse as the day goes on        3 most recent PHQ Screens 2021   Little interest or pleasure in doing things Several days   Feeling down, depressed, irritable, or hopeless Several days   Total Score PHQ 2 2        Vitals:    21 1426   BP: 124/77   Pulse: 98   Resp: 17   Temp: 97.6 °F (36.4 °C)   TempSrc: Temporal   SpO2: 98%   Weight: 215 lb (97.5 kg)   Height: 5' 1\" (1.549 m)   PainSc:   4   PainLoc: Head   LMP: 2021       Health Maintenance Due   Topic    Hepatitis C Screening     HPV Age 9Y-26Y (1 - 2-dose series)    DTaP/Tdap/Td series (1 - Tdap)    Pap Smear        1. Have you been to the ER, urgent care clinic since your last visit? Hospitalized since your last visit? No    2. Have you seen or consulted any other health care providers outside of the [address] [place] [address] since your last visit? Include any pap smears or colon screening.  Dr. Vanessa Esparza podiatrist on [address] [address]

## 2021-12-02 ENCOUNTER — APPOINTMENT (OUTPATIENT)
Dept: CT IMAGING | Age: 26
End: 2021-12-02
Attending: EMERGENCY MEDICINE
Payer: MEDICAID

## 2021-12-02 ENCOUNTER — HOSPITAL ENCOUNTER (EMERGENCY)
Age: 26
Discharge: HOME | End: 2021-12-02
Attending: EMERGENCY MEDICINE
Payer: MEDICAID

## 2021-12-02 VITALS
DIASTOLIC BLOOD PRESSURE: 47 MMHG | OXYGEN SATURATION: 98 % | RESPIRATION RATE: 18 BRPM | TEMPERATURE: 98.4 F | WEIGHT: 199.08 LBS | BODY MASS INDEX: 37.62 KG/M2 | SYSTOLIC BLOOD PRESSURE: 102 MMHG | HEART RATE: 118 BPM

## 2021-12-02 DIAGNOSIS — R51.9 CHRONIC NONINTRACTABLE HEADACHE, UNSPECIFIED HEADACHE TYPE: Primary | ICD-10-CM

## 2021-12-02 DIAGNOSIS — G89.29 CHRONIC NONINTRACTABLE HEADACHE, UNSPECIFIED HEADACHE TYPE: Primary | ICD-10-CM

## 2021-12-02 DIAGNOSIS — H10.213 CHEMICAL CONJUNCTIVITIS OF BOTH EYES: ICD-10-CM

## 2021-12-02 LAB
ALBUMIN SERPL-MCNC: 3.4 G/DL (ref 3.5–5)
ALBUMIN/GLOB SERPL: 0.9 {RATIO} (ref 1.1–2.2)
ALP SERPL-CCNC: 103 U/L (ref 45–117)
ALT SERPL-CCNC: 32 U/L (ref 12–78)
ANION GAP SERPL CALC-SCNC: 12 MMOL/L (ref 5–15)
APPEARANCE UR: ABNORMAL
AST SERPL-CCNC: 29 U/L (ref 15–37)
BACTERIA URNS QL MICRO: ABNORMAL /HPF
BASOPHILS # BLD: 0 K/UL (ref 0–0.1)
BASOPHILS NFR BLD: 0 % (ref 0–1)
BILIRUB SERPL-MCNC: 0.4 MG/DL (ref 0.2–1)
BILIRUB UR QL: NEGATIVE
BUN SERPL-MCNC: 9 MG/DL (ref 6–20)
BUN/CREAT SERPL: 11 (ref 12–20)
CALCIUM SERPL-MCNC: 9.1 MG/DL (ref 8.5–10.1)
CHLORIDE SERPL-SCNC: 103 MMOL/L (ref 97–108)
CO2 SERPL-SCNC: 26 MMOL/L (ref 21–32)
COLOR UR: ABNORMAL
CREAT SERPL-MCNC: 0.84 MG/DL (ref 0.55–1.02)
DIFFERENTIAL METHOD BLD: ABNORMAL
EOSINOPHIL # BLD: 0 K/UL (ref 0–0.4)
EOSINOPHIL NFR BLD: 0 % (ref 0–7)
EPITH CASTS URNS QL MICRO: ABNORMAL /LPF
ERYTHROCYTE [DISTWIDTH] IN BLOOD BY AUTOMATED COUNT: 13.6 % (ref 11.5–14.5)
GLOBULIN SER CALC-MCNC: 4 G/DL (ref 2–4)
GLUCOSE SERPL-MCNC: 103 MG/DL (ref 65–100)
GLUCOSE UR STRIP.AUTO-MCNC: NEGATIVE MG/DL
HCG UR QL: NEGATIVE
HCT VFR BLD AUTO: 38.6 % (ref 35–47)
HGB BLD-MCNC: 12.1 G/DL (ref 11.5–16)
HGB UR QL STRIP: ABNORMAL
IMM GRANULOCYTES # BLD AUTO: 0.1 K/UL (ref 0–0.04)
IMM GRANULOCYTES NFR BLD AUTO: 0 % (ref 0–0.5)
KETONES UR QL STRIP.AUTO: NEGATIVE MG/DL
LEUKOCYTE ESTERASE UR QL STRIP.AUTO: ABNORMAL
LYMPHOCYTES # BLD: 1.5 K/UL (ref 0.8–3.5)
LYMPHOCYTES NFR BLD: 12 % (ref 12–49)
MCH RBC QN AUTO: 26.3 PG (ref 26–34)
MCHC RBC AUTO-ENTMCNC: 31.3 G/DL (ref 30–36.5)
MCV RBC AUTO: 83.9 FL (ref 80–99)
MONOCYTES # BLD: 0.2 K/UL (ref 0–1)
MONOCYTES NFR BLD: 1 % (ref 5–13)
NEUTS SEG # BLD: 10.8 K/UL (ref 1.8–8)
NEUTS SEG NFR BLD: 87 % (ref 32–75)
NITRITE UR QL STRIP.AUTO: NEGATIVE
NRBC # BLD: 0 K/UL (ref 0–0.01)
NRBC BLD-RTO: 0 PER 100 WBC
PH UR STRIP: 6 [PH] (ref 5–8)
PLATELET # BLD AUTO: 247 K/UL (ref 150–400)
PMV BLD AUTO: 11.2 FL (ref 8.9–12.9)
POTASSIUM SERPL-SCNC: 3.9 MMOL/L (ref 3.5–5.1)
PROT SERPL-MCNC: 7.4 G/DL (ref 6.4–8.2)
PROT UR STRIP-MCNC: NEGATIVE MG/DL
RBC # BLD AUTO: 4.6 M/UL (ref 3.8–5.2)
RBC #/AREA URNS HPF: ABNORMAL /HPF (ref 0–5)
SODIUM SERPL-SCNC: 141 MMOL/L (ref 136–145)
SP GR UR REFRACTOMETRY: 1.02 (ref 1–1.03)
UR CULT HOLD, URHOLD: NORMAL
UROBILINOGEN UR QL STRIP.AUTO: 0.2 EU/DL (ref 0.2–1)
WBC # BLD AUTO: 12.6 K/UL (ref 3.6–11)
WBC URNS QL MICRO: ABNORMAL /HPF (ref 0–4)

## 2021-12-02 PROCEDURE — 70450 CT HEAD/BRAIN W/O DYE: CPT

## 2021-12-02 PROCEDURE — 96375 TX/PRO/DX INJ NEW DRUG ADDON: CPT

## 2021-12-02 PROCEDURE — 99284 EMERGENCY DEPT VISIT MOD MDM: CPT

## 2021-12-02 PROCEDURE — 81025 URINE PREGNANCY TEST: CPT

## 2021-12-02 PROCEDURE — 81001 URINALYSIS AUTO W/SCOPE: CPT

## 2021-12-02 PROCEDURE — 80053 COMPREHEN METABOLIC PANEL: CPT

## 2021-12-02 PROCEDURE — 36415 COLL VENOUS BLD VENIPUNCTURE: CPT

## 2021-12-02 PROCEDURE — 96365 THER/PROPH/DIAG IV INF INIT: CPT

## 2021-12-02 PROCEDURE — 74011250637 HC RX REV CODE- 250/637: Performed by: EMERGENCY MEDICINE

## 2021-12-02 PROCEDURE — 85025 COMPLETE CBC W/AUTO DIFF WBC: CPT

## 2021-12-02 PROCEDURE — 74011250636 HC RX REV CODE- 250/636: Performed by: EMERGENCY MEDICINE

## 2021-12-02 RX ORDER — ACETAMINOPHEN 500 MG
1000 TABLET ORAL ONCE
Status: COMPLETED | OUTPATIENT
Start: 2021-12-02 | End: 2021-12-02

## 2021-12-02 RX ORDER — ONDANSETRON 2 MG/ML
4 INJECTION INTRAMUSCULAR; INTRAVENOUS
Status: COMPLETED | OUTPATIENT
Start: 2021-12-02 | End: 2021-12-02

## 2021-12-02 RX ORDER — DIPHENHYDRAMINE HYDROCHLORIDE 50 MG/ML
25 INJECTION, SOLUTION INTRAMUSCULAR; INTRAVENOUS
Status: COMPLETED | OUTPATIENT
Start: 2021-12-02 | End: 2021-12-02

## 2021-12-02 RX ORDER — MAGNESIUM SULFATE HEPTAHYDRATE 40 MG/ML
2 INJECTION, SOLUTION INTRAVENOUS
Status: COMPLETED | OUTPATIENT
Start: 2021-12-02 | End: 2021-12-02

## 2021-12-02 RX ORDER — KETOROLAC TROMETHAMINE 30 MG/ML
15 INJECTION, SOLUTION INTRAMUSCULAR; INTRAVENOUS
Status: COMPLETED | OUTPATIENT
Start: 2021-12-02 | End: 2021-12-02

## 2021-12-02 RX ORDER — DEXAMETHASONE SODIUM PHOSPHATE 10 MG/ML
10 INJECTION INTRAMUSCULAR; INTRAVENOUS
Status: COMPLETED | OUTPATIENT
Start: 2021-12-02 | End: 2021-12-02

## 2021-12-02 RX ADMIN — ONDANSETRON HYDROCHLORIDE 4 MG: 2 SOLUTION INTRAMUSCULAR; INTRAVENOUS at 21:07

## 2021-12-02 RX ADMIN — SODIUM CHLORIDE 1000 ML: 9 INJECTION, SOLUTION INTRAVENOUS at 20:49

## 2021-12-02 RX ADMIN — MAGNESIUM SULFATE HEPTAHYDRATE 2 G: 2 INJECTION, SOLUTION INTRAVENOUS at 21:07

## 2021-12-02 RX ADMIN — DEXAMETHASONE SODIUM PHOSPHATE 10 MG: 10 INJECTION, SOLUTION INTRAMUSCULAR; INTRAVENOUS at 21:06

## 2021-12-02 RX ADMIN — KETOROLAC TROMETHAMINE 15 MG: 30 INJECTION, SOLUTION INTRAMUSCULAR; INTRAVENOUS at 21:06

## 2021-12-02 RX ADMIN — DIPHENHYDRAMINE HYDROCHLORIDE 25 MG: 50 INJECTION INTRAMUSCULAR; INTRAVENOUS at 21:08

## 2021-12-02 RX ADMIN — ACETAMINOPHEN 1000 MG: 500 TABLET ORAL at 19:44

## 2021-12-02 RX ADMIN — SODIUM CHLORIDE 1000 ML: 9 INJECTION, SOLUTION INTRAVENOUS at 21:59

## 2021-12-02 NOTE — ED TRIAGE NOTES
Pt arrives with blurred vision to bilateral eyes after cleaning a \"griddle\" and getting smoke from the griddle in her eyes. Pt also reports having a cold for the last week.

## 2021-12-03 NOTE — ED PROVIDER NOTES
The history is provided by the patient and a relative. Eye Pain   This is a new problem. The current episode started 1 to 2 hours ago. The problem occurs constantly. The problem has not changed since onset. Both eyes are affected. The injury mechanism was a chemical exposure. The pain is moderate. There is no history of trauma to the eye. There is no known exposure to pink eye. She does not wear contacts. Associated symptoms include blurred vision, eye redness and pain. Pertinent negatives include no numbness, no decreased vision, no discharge, no double vision, no foreign body sensation, no photophobia, no nausea, no vomiting, no tingling, no weakness, no itching, no fever, no blindness, no head injury and no dizziness. She has tried water, commercial eye wash and eye drops for the symptoms. The treatment provided no relief.         Past Medical History:   Diagnosis Date    Anxiety     Chronic pain     Depression     Endometriosis     Headache     Lupus ([facility] [address] 2017    Migraines     Patella-femoral syndrome        Past Surgical History:   Procedure Laterality Date    HX HEENT  2001    swallowed foreign body nickel         Family History:   Problem Relation Age of Onset    Hypertension Mother     Arthritis-rheumatoid Mother     Hypertension Father     Arthritis-rheumatoid Brother        Social History     Socioeconomic History    Marital status: SINGLE     Spouse name: Not on file    Number of children: Not on file    Years of education: Not on file    Highest education level: Not on file   Occupational History    Not on file   Tobacco Use    Smoking status: Never Smoker    Smokeless tobacco: Never Used   Vaping Use    Vaping Use: Never used   Substance and Sexual Activity    Alcohol use: Yes     Comment: social    Drug use: Yes     Frequency: 1.0 times per week     Types: Marijuana     Comment: 1x/wk for anxiety and occas pain    Sexual activity: Not Currently   Other Topics Concern    Not on file   Social History Narrative    Not on file     Social Determinants of Health     Financial Resource Strain:     Difficulty of Paying Living Expenses: Not on file   Food Insecurity:     Worried About Running Out of Food in the Last Year: Not on file    Korin of Food in the Last Year: Not on file   Transportation Needs:     Lack of Transportation (Medical): Not on file    Lack of Transportation (Non-Medical): Not on file   Physical Activity:     Days of Exercise per Week: Not on file    Minutes of Exercise per Session: Not on file   Stress:     Feeling of Stress : Not on file   Social Connections:     Frequency of Communication with Friends and Family: Not on file    Frequency of Social Gatherings with Friends and Family: Not on file    Attends Jainism Services: Not on file    Active Member of [address] [place] [address] [employer] or Organizations: Not on file    Attends Club or Organization Meetings: Not on file    Marital Status: Not on file   Intimate Partner Violence:     Fear of Current or Ex-Partner: Not on file    Emotionally Abused: Not on file    Physically Abused: Not on file    Sexually Abused: Not on file   Housing Stability:     Unable to Pay for Housing in the Last Year: Not on file    Number of [place] in the Last Year: Not on file    Unstable Housing in the Last Year: Not on file         ALLERGIES: Pcn [penicillins] and Plaquenil [hydroxychloroquine]    Review of Systems   Constitutional: Negative for activity change, chills and fever. HENT: Negative for nosebleeds, sore throat, trouble swallowing and voice change. Eyes: Positive for blurred vision, pain and redness. Negative for blindness, double vision, photophobia, discharge and visual disturbance. Respiratory: Negative for shortness of breath. Cardiovascular: Negative for chest pain and palpitations. Gastrointestinal: Negative for abdominal pain, constipation, diarrhea, nausea and vomiting.    Genitourinary: Negative for difficulty urinating, dysuria, hematuria and urgency. Musculoskeletal: Negative for back pain, neck pain and neck stiffness. Skin: Negative for color change and itching. Allergic/Immunologic: Negative for immunocompromised state. Neurological: Negative for dizziness, tingling, seizures, syncope, weakness, light-headedness, numbness and headaches. Psychiatric/Behavioral: Negative for behavioral problems, confusion, hallucinations, self-injury and suicidal ideas. Vitals:    12/02/21 1855   BP: (!) 153/82   Pulse: (!) 120   Resp: 16   Temp: 98.6 °F (37 °C)   SpO2: 96%   Weight: 90.3 kg (199 lb 1.2 oz)            Physical Exam  Vitals and nursing note reviewed. Constitutional:       General: She is not in acute distress. Appearance: She is well-developed. She is not diaphoretic. HENT:      Head: Atraumatic. Eyes:      General: Lids are normal.      Extraocular Movements: Extraocular movements intact. Conjunctiva/sclera:      Right eye: Right conjunctiva is injected. Left eye: Left conjunctiva is injected. Neck:      Trachea: No tracheal deviation. Cardiovascular:      Comments: Warm and well perfused  Pulmonary:      Effort: Pulmonary effort is normal. No respiratory distress. Musculoskeletal:         General: Normal range of motion. Skin:     General: Skin is warm and dry. Neurological:      Mental Status: She is alert. Coordination: Coordination normal.   Psychiatric:         Behavior: Behavior normal.         Thought Content: Thought content normal.         Judgment: Judgment normal.          MDM     This is a 70-year-old female with past medical history, review of systems, physical exam as above, presenting with complaints of bilateral eye irritation, blurry vision, headache. Patient states recently treated for sinusitis, switched from azithromycin to Bactrim yesterday by ENT, patient states approximately 3 weeks to 1 month of headache, with associated sinus pressure.   She states this evening she was cleaning a grill, when she heated the oil and cleaning materials on the grill surface, causing gas exposure patient states began to experience burning in the bilateral eyes. She states she applied commercial eyedrops as well as an eyewash with increased irritation. She denies contract use. Physical exam remarkable for a well-appearing young female, in no acute distress noted to be hypertensive and tachycardic, patient endorses a history of \"whitecoat anxiety\". Physical exam remarkable for equal and reactive pupils, extraocular movements intact, mild conjunctivitis, and mild clear discharge bilaterally. Discussed with patient and mother at bedside treatment of headache, as well as irrigation for likely chemical conjunctivitis. We will reassess, and make a disposition.     Procedures

## 2021-12-03 NOTE — ED TRIAGE NOTES
Patient  given copy of dc instructions and  script(s). Patient  verbalized understanding of instructions and script (s). Patient alert and oriented and in no acute distress.

## 2021-12-17 ENCOUNTER — TRANSCRIBE ORDER (OUTPATIENT)
Dept: SCHEDULING | Age: 26
End: 2021-12-17

## 2021-12-17 DIAGNOSIS — J32.4 CHRONIC PANSINUSITIS: Primary | ICD-10-CM

## 2022-01-19 ENCOUNTER — HOSPITAL ENCOUNTER (OUTPATIENT)
Dept: CT IMAGING | Age: 27
Discharge: HOME | End: 2022-01-19
Attending: OTOLARYNGOLOGY
Payer: MEDICAID

## 2022-01-19 DIAGNOSIS — J32.4 CHRONIC PANSINUSITIS: ICD-10-CM

## 2022-01-19 PROCEDURE — 70486 CT MAXILLOFACIAL W/O DYE: CPT

## 2022-01-26 ENCOUNTER — TRANSCRIBE ORDER (OUTPATIENT)
Dept: SCHEDULING | Age: 27
End: 2022-01-26

## 2022-01-26 DIAGNOSIS — H93.A2 PULSATILE TINNITUS OF LEFT EAR: Primary | ICD-10-CM

## 2022-02-16 ENCOUNTER — HOSPITAL ENCOUNTER (OUTPATIENT)
Dept: MRI IMAGING | Age: 27
Discharge: HOME | End: 2022-02-16
Attending: OTOLARYNGOLOGY
Payer: MEDICAID

## 2022-02-16 DIAGNOSIS — H93.A2 PULSATILE TINNITUS OF LEFT EAR: ICD-10-CM

## 2022-02-16 PROCEDURE — 70544 MR ANGIOGRAPHY HEAD W/O DYE: CPT

## 2022-03-18 PROBLEM — M32.9 LUPUS (HCC): Status: ACTIVE | Noted: 2021-01-07

## 2022-03-19 PROBLEM — F41.1 GAD (GENERALIZED ANXIETY DISORDER): Status: ACTIVE | Noted: 2021-01-07

## 2022-03-19 PROBLEM — Z86.59 HISTORY OF EATING DISORDER: Status: ACTIVE | Noted: 2021-01-07

## 2022-03-20 PROBLEM — F32.A DEPRESSION: Status: ACTIVE | Noted: 2021-01-07

## 2022-03-22 ENCOUNTER — VIRTUAL VISIT (OUTPATIENT)
Dept: NEUROSURGERY | Age: 27
End: 2022-03-22
Payer: MEDICAID

## 2022-03-22 DIAGNOSIS — Z86.59 HISTORY OF EATING DISORDER: Primary | ICD-10-CM

## 2022-03-22 DIAGNOSIS — H93.A3 PULSATILE TINNITUS OF BOTH EARS: ICD-10-CM

## 2022-03-22 PROCEDURE — 99204 OFFICE O/P NEW MOD 45 MIN: CPT | Performed by: RADIOLOGY

## 2022-03-22 RX ORDER — BUSPIRONE HYDROCHLORIDE 5 MG/1
5 TABLET ORAL 2 TIMES DAILY
COMMUNITY
Start: 2022-03-14 | End: 2022-05-18 | Stop reason: ALTCHOICE

## 2022-03-22 NOTE — PATIENT INSTRUCTIONS
Your history of weight gain and imaging suggest a disorder called idiopathic intracranial hypertension (also known as pseudotumor cerebri). In a minority of patients, stenting of cerebral venous sinuses is required to lower venous pressure in your head to improve reabsorption of cerebrospinal fluid. At this point, it is too early to say whether you will need this procedure. Generally, this condition response to medical treatment and weight loss. You are being referred to the medical weight loss clinic for consultation. You are being referred to Dr. Viky Ludwig (neurology) for medical management of your headaches and intracranial hypertension. You are being referred to Dr. Elizabeth Howard for a thorough eye exam and an assessment of your vision risk. In some cases, permanent vision loss can result from intracranial hypertension. If you experience stroke symptoms or acute changes in your vision, it is important to get emergency medical treatment quickly to avoid permanent deficits. Follow-up with Dr. Alpa Kathleen in 3 months after you have been seen and assessed by the doctors listed above. Narrowed bilateral transverse cerebral venous sinuses. Questionable abnormal signal is also seen on your MRA which may be an artifact from breathing in the MRI scanner or a low flow arteriovenous fistula. If your symptoms do not improve with medical treatment or weight loss, a diagnostic cerebral angiogram (outpatient procedure) may be indicated to rule out this rare entity. Please obtain records from your rheumatologist (regarding autoimmune disorder) for all future doctor visits. Learning About How Hospitals and Clinics Keep You Safe From COVID-19  Overview     Many hospitals and clinics now are treating people who are infected with COVID-19. So if you're in the hospital or clinic for any other reason, this may be an unsettling time.  It's common to be concerned about becoming infected with the virus.  But hospitals and clinics have policies to prevent the spread of infections. For example, doctors and nurses are trained to wash their hands before they treat you. Health care centers have stepped up these policies now. They are taking further steps to protect their patients. As long as QHCVT-60 remains a public health problem, things are going to be different when you go to a health care facility. They may have new rules for your safety. These could include having you wear a cloth face cover, meeting you outside the clinic, and having you sit away from others in the waiting room. What are hospitals and clinics doing to keep you safe? Your care team is very aware of the threat of COVID-19. They are doing everything they can to keep you safe. Hospitals and clinics may have different policies. But in general, you may expect many of these measures:  · You will be screened for COVID-19. When you come to the hospital, you may have your temperature taken. You'll be asked about any symptoms, such as a fever, a cough, or shortness of breath. You may be asked if you've had contact with anyone who's been diagnosed with the virus. And you may be asked if you've traveled to any place that has had an outbreak. · The staff may try to do as much as possible outside the facility. For example, you may be asked to fill out paperwork online or in your car before you come inside. The person giving you a ride home may be asked to wait outside. These new rules to help protect you may make routine tasks take longer than usual.  · People who have COVID-19 are treated in a separate area. Many hospitals and clinics have staff members who treat only these patients. This helps limit the spread of the infection. · Visitors may be limited. In some cases, no visitors are allowed. In others, only one healthy visitor is allowed. Children may be limited to having only one adult with them.  You can connect with family and friends using your phone or computer. If you need something brought from home, such as glasses or a phone , find out where the item can be dropped off. · The hospital or clinic follows guidelines to prevent infection. These include:  ? Washing hands often. ? Disinfecting high-touch surfaces. ? Wearing face masks or other protective equipment. ? Making extra space for social distancing. What can you do to stay safe? We all have a role to play in keeping ourselves safe and preventing the spread of COVID-19. Here are some things you can do while you're receiving care. If you're in a hospital, stay in your room. This will limit your exposure to the virus. It may be boring, but it's the safest place for you. Wash your hands often. Use soap and water, and scrub for 20 seconds. Then rinse and dry them well. Always wash them after you use the bathroom, before you eat, and after you cough, sneeze, or blow your nose. If you can't wash your hands, use hand . Speak up if you have safety concerns. Don't be shy to correct health care workers if they aren't washing their hands properly, wearing face masks, or taking other precautions. These actions are vital to prevent the spread of infection. Try to be understanding. This is a stressful time for everyone, including your care team. They are doing their best to keep you safe and provide you with good care. Where can you learn more? Go to http://www.gray.com/  Enter A134 in the search box to learn more about \"Learning About How Hospitals and [facility] Safe From COVID-19. \"  Current as of: March 26, 2021               Content Version: 13.2  © 2592-9813 [employer]. Care instructions adapted under license by [employer] (which disclaims liability or warranty for this information).  If you have questions about a medical condition or this instruction, always ask your healthcare professional. [employer] disclaims any warranty or liability for your use of this information.

## 2022-03-22 NOTE — PROGRESS NOTES
Neurointerventional Surgery Clinic Note    Justin Ruiz is a 23 y.o. female who was seen by synchronous (real-time) audio-video technology on 3/22/2022. Consent: Justin Ruiz, who was seen by synchronous (real-time) audio-video technology, and/or her healthcare decision maker, is aware that this patient-initiated, Telehealth encounter on 3/22/2022 is a billable service, with coverage as determined by her insurance carrier. She is aware that she may receive a bill and has provided verbal consent to proceed: Yes. Assessment & Plan:     Bilateral pulsatile tinnitus associated with worsening of migraine headaches and 60 pound weight gain. Intracranial hypertension (pseudotumor cerebri) is suspected. The patient has no visual complaints at this time but understands that the long-term risks of this disorder are permanent vision loss. She has been referred to Dr. Sharon Piña (ophthalmology) for a thorough funduscopic examination. The patient is also being referred to Dr. Agatha Slater in neurology for medical management and an opinion about this diagnosis. I also see questionable abnormal signal intensity in the right sigmoid sinus and jugular bulb on the MRA images which can be seen in the clinical setting of arteriovenous fistula. These lesions are rare. Based on my clinical examination and the patient's history, I suspect that pseudotumor cerebri and narrowing of bilateral transverse sinuses are the etiology of her pulsatile tinnitus. However, if she does not improve with weight loss and medical management, diagnostic cerebral angiography may be indicated to definitively rule out a fistula. Follow-up in 3 months after the patient has been seen and evaluated by Dr. Shine Melgar and Dr. Radha Walter. The patient was also referred to the medical weight loss clinic per her request.  She has been having difficulty losing weight on her current antidepressant medications.     I spent at least 60 minutes on this visit with this new patient. Subjective:     Jayleen Vasquez is a 26-year-old morbidly obese female referred by Dr. Alexa Herman (ENT) for longstanding pulsatile tinnitus with structurally normal ear anatomy. The patient first noticed pulsatile tinnitus in 2021 that she initially heard as a \"pounding\" in her left ear only. She presented to an urgent care clinic where antibiotics were prescribed (2 rounds) with no improvement. After a few weeks, the patient began to notice pulsatile tinnitus in both ears and that has continued up until present. She also noticed increased frequency and severity of migraine headaches. She has suffered from migraine since she was a child (prepubescent) along with her mother and other members of the family. Prior to first noticing the pulsatile tinnitus, the patient reports a 60 pound weight gain that she attributes to antidepressant medications that she was taking. Recently she has lost 30 pounds but there has been no improvement in the pulsatile tinnitus. She is 5 feet 1 inches and weighs 183 pounds currently. On 3 occasions, the patient reports brief \"sharp pains\" in her left ear. She does not endorse prior head trauma or thrombosis. However, she has been diagnosed with an \"autoimmune disorder\" by rheumatologist, Dr. Juli Clark ([address] [place] [address], [facility]). She cannot remember the name of the autoimmune disorder today. She was initially told by another physician that she had lupus but her rheumatologist has apparently ruled out lupus now. At one point she was placed on Plaquenil which caused a skin rash and oral thrush. Her past history is significant for depression, generalized anxiety disorder, eating disorder (per chart) and acute respiratory distress in  with surfactant disorder as a .     Past surgical history is significant for tracheoscopy to remove a quarter that obstructed her airway at age 4.  She reports no other surgeries    She reports smoking marijuana regularly \"but not every day\". She does not smoke cigarettes. She drinks alcohol socially. Family history is significant for DVT (paternal grandfather). There is no family history of aneurysms or stroke. She is allergic to penicillin which causes hives. Plaquenil also as described above        Prior to Admission medications    Medication Sig Start Date End Date Taking? Authorizing Provider   busPIRone (BUSPAR) 5 mg tablet Take 5 mg by mouth two (2) times a day. 3/14/22  Yes Provider, Historical   ibuprofen (MOTRIN) 600 mg tablet 600 mg. Yes Provider, Historical   Blisovi Fe , , 1 mg-20 mcg (21)/75 mg (7) tab  10/6/21  Yes Provider, Historical   FLUoxetine (PROzac) 40 mg capsule Take 1 Capsule by mouth daily. Indications: major depressive disorder 21  Yes MD Denis Ibarrap [place]) 50 mg tablet Take 1 Tablet by mouth daily as needed for Migraine. 21   Tana Overall, DO   mirtazapine (REMERON) 15 mg tablet Take 1 Tablet by mouth nightly. Indications: major depressive disorder 21   Audrey Alvarez MD   trimethoprim-sulfamethoxazole (BACTRIM DS, SEPTRA DS) 160-800 mg per tablet Take 1 Tablet by mouth two (2) times a day. Indications: urinary tract infection due to E. coli bacteria  Patient not taking: Reported on 2021   Audrey Alvarez MD   traZODone (DESYREL) 50 mg tablet Take 1 Tablet by mouth nightly. Indications: insomnia associated with depression  Patient not taking: Reported on 2021   Audrey Alvarez MD     Allergies   Allergen Reactions    Pcn [Penicillins] Hives    Plaquenil [Hydroxychloroquine] Rash     Rash and oral ulcers       Patient Active Problem List   Diagnosis Code    Lupus ([facility] [address] [facility] M32.9    SHALINI (generalized anxiety disorder) F41.1    Depression F32. A    History of eating disorder Z86.59    Acute respiratory distress in  with surfactant disorder P22.0  Pulsatile tinnitus of both ears H93. A3     Patient Active Problem List    Diagnosis Date Noted    Pulsatile tinnitus of both ears 2022    Acute respiratory distress in  with surfactant disorder 2021    Lupus ([facility] 2021    SHALINI (generalized anxiety disorder) 2021    Depression 2021    History of eating disorder 2021     Current Outpatient Medications   Medication Sig Dispense Refill    busPIRone (BUSPAR) 5 mg tablet Take 5 mg by mouth two (2) times a day.  ibuprofen (MOTRIN) 600 mg tablet 600 mg.  Blisovi Fe , , 1 mg-20 mcg (21)/75 mg (7) tab       FLUoxetine (PROzac) 40 mg capsule Take 1 Capsule by mouth daily. Indications: major depressive disorder 30 Capsule 0    ubrogepant (Ubrelvy) 50 mg tablet Take 1 Tablet by mouth daily as needed for Migraine. 10 Tablet 1    mirtazapine (REMERON) 15 mg tablet Take 1 Tablet by mouth nightly. Indications: major depressive disorder 30 Tablet 0    trimethoprim-sulfamethoxazole (BACTRIM DS, SEPTRA DS) 160-800 mg per tablet Take 1 Tablet by mouth two (2) times a day. Indications: urinary tract infection due to E. coli bacteria (Patient not taking: Reported on 2021) 6 Tablet 0    traZODone (DESYREL) 50 mg tablet Take 1 Tablet by mouth nightly.  Indications: insomnia associated with depression (Patient not taking: Reported on 2021) 30 Tablet 0     Allergies   Allergen Reactions    Pcn [Penicillins] Hives    Plaquenil [Hydroxychloroquine] Rash     Rash and oral ulcers     Past Medical History:   Diagnosis Date    Anxiety     Chronic pain     Depression     Endometriosis     Headache     Lupus ([facility] 2017    Migraines     Patella-femoral syndrome      Past Surgical History:   Procedure Laterality Date    HX HEENT      swallowed foreign body nickel     Family History   Problem Relation Age of Onset    Hypertension Mother    Jose Luis Lofton Arthritis-rheumatoid Mother     Hypertension Father    Jose Luis Lofton Arthritis-rheumatoid Brother      Social History     Tobacco Use    Smoking status: Never Smoker    Smokeless tobacco: Never Used   Substance Use Topics    Alcohol use: Yes     Comment: social       ROS: Pertinent ROS included in HPI    Objective:   Vital Signs: (As obtained by patient/caregiver at home)  There were no vitals taken for this visit. Constitutional: [x] Appears well-developed and well-nourished [x] No apparent distress      [] Abnormal -     Mental status: [x] Alert and awake  [x] Oriented to person/place/time [x] Able to follow commands    [] Abnormal -     Eyes:   EOM    [x]  Normal    [] Abnormal -   Sclera  [x]  Normal    [] Abnormal -          Discharge [x]  None visible   [] Abnormal -     HENT: [x] Normocephalic, atraumatic  [] Abnormal -   [x] Mouth/Throat: Mucous membranes are moist    External Ears [x] Normal  [] Abnormal -    Neck: [x] No visualized mass [] Abnormal -     Pulmonary/Chest: [x] Respiratory effort normal   [x] No visualized signs of difficulty breathing or respiratory distress        [] Abnormal -      Musculoskeletal:   [x] Normal gait with no signs of ataxia         [x] Normal range of motion of neck        [] Abnormal -     Neurological:        [x] No Facial Asymmetry (Cranial nerve 7 motor function) (limited exam due to video visit)          [x] No gaze palsy        [] Abnormal -          Skin:        [x] No significant exanthematous lesions or discoloration noted on facial skin         [] Abnormal -            Psychiatric:       [x] Normal Affect [] Abnormal -        [x] No Hallucinations    Other pertinent observable physical exam findings:-    Neurologic Exam:  Mental Status:  Alert and oriented x 4. Appropriate affect, mood and behavior. Language:    Normal fluency, repetition, comprehension and naming. Cranial Nerves:   Cannot assess pupillary reaction.      Visual fields appear to be full to confrontation, but difficult to evaluate virtually. Extraocular movements intact. Facial sensation intact V1 - V3. Full facial strength, no asymmetry. Hearing intact bilaterally. No dysarthria. Tongue protrudes to midline, palate elevates symmetrically. Shoulder shrug 5/5 bilaterally. Motor:    No pronator drift. Bulk and tone appear to be normal.      Difficult to evaluate strength, but seems intact     No involuntary movements. Sensation:    Sensation grossly intact subjectively    Reflexes:    Deferred    Coordination & Gait: Tandem gait normal.  Rapid alternating movements and finger-to-nose exercises are normal.    Imaging: I independently reviewed an MRA of the brain without contrast and MRV without contrast obtained on 2/16/2022. I independently reviewed a CT of the paranasal sinuses without contrast obtained on 1/19/2022 and CT of the head without contrast obtained on 12/2/2021. The MRA demonstrates abnormal signal intensity in the right sigmoid sinus and right jugular bulb as well as the straight sinus which may represent respiratory artifact or arteriovenous shunting. Artifact is favored. On the MR venogram, there is obvious severe narrowing of bilateral transverse venous sinuses near the sigmoid junction. CT of the head demonstrates a small cyst in her right mesial temporal lobe. We discussed the expected course, resolution and complications of the diagnosis(es) in detail. Medication risks, benefits, costs, interactions, and alternatives were discussed as indicated. I advised her to contact the office if her condition worsens, changes or fails to improve as anticipated. She expressed understanding with the diagnosis(es) and plan. Amanda Winters is a 23 y.o. female who was evaluated by a video visit encounter for concerns as above. Patient identification was verified prior to start of the visit. A caregiver was present when appropriate.  Due to this being a TeleHealth encounter (During WHATM-85 public health emergency), evaluation of the following organ systems was limited: Vitals/Constitutional/EENT/Resp/CV/GI//MS/Neuro/Skin/Heme-Lymph-Imm. Pursuant to the emergency declaration under the [facility], [facility] waiver authority and the [employer] Act, this Virtual  Visit was conducted, with patient's (and/or legal guardian's) consent, to reduce the patient's risk of exposure to COVID-19 and provide necessary medical care. Services were provided through a video synchronous discussion virtually to substitute for in-person clinic visit. Patient was located at home, and provider was located in office. This visit was completed virtually using Epic.     Zaheer Ramires MD

## 2022-03-23 NOTE — PROGRESS NOTES
Phone call to patient in preparation for Virtual/phone visit with provider. Name and  verified. Patient unable to obtain vital signs at home. New patient referred by Dr Devin Julien presenting with Pulsatile tinnitus bilaterally. Reports pounding and heartbeat sound started in her left ear 2021. Currently sound is in both ears. Reports 2-3 episodes of sharp pain in her left ear. Reports frequent headaches and a history of migraines. No acute problems reported.

## 2022-03-24 PROBLEM — H93.A3 PULSATILE TINNITUS OF BOTH EARS: Status: ACTIVE | Noted: 2022-03-22

## 2022-03-30 ENCOUNTER — TELEPHONE (OUTPATIENT)
Dept: SURGERY | Age: 27
End: 2022-03-30

## 2022-03-30 DIAGNOSIS — E66.01 MORBID OBESITY (HCC): ICD-10-CM

## 2022-03-30 DIAGNOSIS — Z76.89 ENCOUNTER FOR WEIGHT MANAGEMENT: Primary | ICD-10-CM

## 2022-03-30 NOTE — TELEPHONE ENCOUNTER
Returning patients voicemail regarding a referral she received to [address]. Referral is entered for \"bariatric surgery. \" Will discuss with patient if she would like to do surgical, or non surgical marybel loss program.

## 2022-04-01 ENCOUNTER — OFFICE VISIT (OUTPATIENT)
Dept: NEUROLOGY | Age: 27
End: 2022-04-01
Payer: MEDICAID

## 2022-04-01 VITALS
SYSTOLIC BLOOD PRESSURE: 128 MMHG | HEIGHT: 61 IN | DIASTOLIC BLOOD PRESSURE: 90 MMHG | RESPIRATION RATE: 20 BRPM | OXYGEN SATURATION: 98 % | WEIGHT: 199 LBS | BODY MASS INDEX: 37.57 KG/M2 | HEART RATE: 102 BPM

## 2022-04-01 DIAGNOSIS — G93.2 IIH (IDIOPATHIC INTRACRANIAL HYPERTENSION): ICD-10-CM

## 2022-04-01 DIAGNOSIS — I67.9 INTRACRANIAL VASCULAR STENOSIS: ICD-10-CM

## 2022-04-01 DIAGNOSIS — G43.709 CHRONIC MIGRAINE W/O AURA, NOT INTRACTABLE, W/O STAT MIGR: ICD-10-CM

## 2022-04-01 DIAGNOSIS — H93.A3 PULSATILE TINNITUS OF BOTH EARS: Primary | ICD-10-CM

## 2022-04-01 PROCEDURE — 99204 OFFICE O/P NEW MOD 45 MIN: CPT | Performed by: PSYCHIATRY & NEUROLOGY

## 2022-04-01 RX ORDER — TOPIRAMATE 25 MG/1
25 TABLET ORAL 2 TIMES DAILY
Qty: 60 TABLET | Refills: 5 | Status: SHIPPED | OUTPATIENT
Start: 2022-04-01 | End: 2022-04-29

## 2022-04-01 RX ORDER — PROMETHAZINE HYDROCHLORIDE 25 MG/1
25 TABLET ORAL
COMMUNITY
End: 2022-05-18 | Stop reason: ALTCHOICE

## 2022-04-01 RX ORDER — DIAZEPAM 5 MG/1
TABLET ORAL
COMMUNITY
Start: 2022-01-21 | End: 2022-05-18 | Stop reason: ALTCHOICE

## 2022-04-01 RX ORDER — ONDANSETRON 4 MG/1
4 TABLET, ORALLY DISINTEGRATING ORAL
Qty: 30 TABLET | Refills: 1 | Status: SHIPPED | OUTPATIENT
Start: 2022-04-01 | End: 2022-05-18 | Stop reason: ALTCHOICE

## 2022-04-01 RX ORDER — KETOROLAC TROMETHAMINE 10 MG/1
10 TABLET, FILM COATED ORAL
COMMUNITY
Start: 2021-03-23 | End: 2022-05-18 | Stop reason: ALTCHOICE

## 2022-04-01 RX ORDER — ESCITALOPRAM OXALATE 20 MG/1
20 TABLET ORAL
COMMUNITY
End: 2022-04-01

## 2022-04-01 NOTE — PROGRESS NOTES
Ms. Geoffrey Vallecillo presents as a new patient for evaluation of daily headaches. She was referred by Dr. Genevieve Gay.

## 2022-04-01 NOTE — LETTER
4/1/2022    Patient: Emeli Meza   YOB: 1995   Date of Visit: 4/1/2022     Ana Melendez, [address] [address]  V[address] [facility], [address] [facility] [address] [facility] [address]  Via In [address] [address]  Via Fax: [phone]    Dear Ana Melendez, MD Jeremiah Everett MD,      Thank you for referring Ms. Emeli Meza to [address] [place] [address] for evaluation. My notes for this consultation are attached. If you have questions, please do not hesitate to call me. I look forward to following your patient along with you. Sincerely,    [address] [facility]     4/1/2022     Patient:  Emeli Meza   YOB: 1995  Date of Visit: 4/1/2022      Dear Aline Almazan [facility] [address]  [address] [facility] Lisa Pitts MD  [address] [facility] [address]  [address]ia In [address] [address]  Via Fax: [phone]: I was requested by Misael Lomas DO to evaluate Ms. Emeli Meza  for   Chief Complaint   Patient presents with   [facility] Neurologic Problem     referred  by Dr. Yash Rincon Migraine   . I am recommending the following:     Diagnoses and all orders for this visit:    1. Pulsatile tinnitus of both ears    2. IIH (idiopathic intracranial hypertension)    3. Chronic migraine w/o aura, not intractable, w/o stat migr    4. Intracranial vascular stenosis    Other orders  -     topiramate (TOPAMAX) 25 mg tablet; Take 1 Tablet by mouth two (2) times a day. -     ondansetron (ZOFRAN ODT) 4 mg disintegrating tablet;  Take 1 Tablet by mouth every eight (8) hours as needed for Nausea (with headache).        ----------------------------------------------------------------------------------------------------------------------  Below is my encounter:       Chief Complaint   Patient presents with    Neurologic Problem     referred  by Dr. Regino Haley Migraine       Referred by: DR Jonathan Daniel / Lily Walter is a 27-year-old woman referred to me from interventional for headaches. I spoke with her personally. I also reviewed the neuroimaging. It sounds like she has had headaches ever since she was a child. Mother has migraines. She has many types of headaches. Her most common type of headache usually is posterior gradually becoming diffuse more on the left sometimes throbbing with nausea light sensitivity. As of August 2021 she began to have pulsatile tinnitus of both sides more on the left. She has gone to the ER for headaches. Normal head CT. She was referred to neuro interventional who has identified intracranial vascular stenosis. She sees ophthalmology next week for formal evaluation. Additionally, she has had a hard time losing weight. She struggles with anxiety and PTSD on various psychotropics. No loss of vision no double vision. She is already enrolled in the weight loss clinic. Review of Systems   Eyes: Negative for double vision. Neurological: Positive for headaches. All other systems reviewed and are negative.       Past Medical History:   Diagnosis Date    Anxiety     Chronic pain     Depression     Endometriosis     Headache     Lupus ([facility] [address] 2017    Migraines     Patella-femoral syndrome      Family History   Problem Relation Age of Onset    Hypertension Mother     Arthritis-rheumatoid Mother     Hypertension Father     Arthritis-rheumatoid Brother      Social History     Socioeconomic History    Marital status: SINGLE     Spouse name: Not on file    Number of children: Not on file    Years of education: Not on file    Highest education level: Not on file   Occupational History    Not on file   Tobacco Use    Smoking status: Never Smoker    Smokeless tobacco: Never Used   Vaping Use    Vaping Use: Never used   Substance and Sexual Activity    Alcohol use: Yes     Comment: social    Drug use: Yes     Frequency: 1.0 times per week     Types: Marijuana     Comment: 1x/wk for anxiety and occas pain    Sexual activity: Not Currently   Other Topics Concern    Not on file   Social History Narrative    Not on file     Social Determinants of Health     Financial Resource Strain:     Difficulty of Paying Living Expenses: Not on file   Food Insecurity:     Worried About Running Out of Food in the Last Year: Not on file    Korin of Food in the Last Year: Not on file   Transportation Needs:     Lack of Transportation (Medical): Not on file    Lack of Transportation (Non-Medical):  Not on file   Physical Activity:     Days of Exercise per Week: Not on file    Minutes of Exercise per Session: Not on file   Stress:     Feeling of Stress : Not on file   Social Connections:     Frequency of Communication with Friends and Family: Not on file    Frequency of Social Gatherings with Friends and Family: Not on file    Attends [employer] Services: Not on file    Active Member of [employer] or Organizations: Not on file    Attends Club or Organization Meetings: Not on file    Marital Status: Not on file   Intimate Partner Violence:     Fear of Current or Ex-Partner: Not on file    Emotionally Abused: Not on file    Physically Abused: Not on file    Sexually Abused: Not on file   Housing Stability:     Unable to Pay for Housing in the Last Year: Not on file    Number of [place] in the Last Year: Not on file    Unstable Housing in the Last Year: Not on file     Current Outpatient Medications   Medication Sig    diazePAM (VALIUM) 5 mg tablet     ketorolac (TORADOL) 10 mg tablet 10 mg.    topiramate (TOPAMAX) 25 mg tablet Take 1 Tablet by mouth two (2) times a day.    ondansetron (ZOFRAN ODT) 4 mg disintegrating tablet Take 1 Tablet by mouth every eight (8) hours as needed for Nausea (with headache).  busPIRone (BUSPAR) 5 mg tablet Take 5 mg by mouth two (2) times a day.  ibuprofen (MOTRIN) 600 mg tablet 600 mg.  Blisovi Fe 1/20, 28, 1 mg-20 mcg (21)/75 mg (7) tab     FLUoxetine (PROzac) 40 mg capsule Take 1 Capsule by mouth daily. Indications: major depressive disorder    promethazine (PHENERGAN) 25 mg tablet 25 mg.  ubrogepant Sobeida Grime) 50 mg tablet Take 1 Tablet by mouth daily as needed for Migraine.  trimethoprim-sulfamethoxazole (BACTRIM DS, SEPTRA DS) 160-800 mg per tablet Take 1 Tablet by mouth two (2) times a day. Indications: urinary tract infection due to E. coli bacteria (Patient not taking: Reported on 11/9/2021)     No current facility-administered medications for this visit. Allergies   Allergen Reactions    Pcn [Penicillins] Hives    Plaquenil [Hydroxychloroquine] Rash     Rash and oral ulcers         Neurologic Exam     Mental Status   Oriented to person, place, and time. Cranial Nerves   Cranial nerves II through XII intact. Motor Exam   Muscle bulk: normal    Strength   Strength 5/5 throughout. Sensory Exam   Light touch normal.     Gait, Coordination, and Reflexes     Gait  Gait: normal    Physical Exam  Vitals and nursing note reviewed. Constitutional:       Appearance: Normal appearance. She is obese. Cardiovascular:      Rate and Rhythm: Normal rate. Pulmonary:      Effort: Pulmonary effort is normal.   Neurological:      Mental Status: She is alert and oriented to person, place, and time. Gait: Gait is intact.       Deep Tendon Reflexes: Strength normal.       Visit Vitals  BP (!) 128/90   Pulse (!) 102   Resp 20   Ht 5' 1\" (1.549 m)   Wt 199 lb (90.3 kg)   SpO2 98%   BMI 37.60 kg/m²       Lab Results   Component Value Date/Time    WBC 12.6 (H) 12/02/2021 08:48 PM    HGB 12.1 12/02/2021 08:48 PM    HCT 38.6 12/02/2021 08:48 PM    PLATELET 251 45/25/8374 08:48 PM    MCV 83.9 12/02/2021 08:48 PM     Lab Results   Component Value Date/Time    Cholesterol, total 184 08/03/2021 06:13 AM    HDL Cholesterol 58 08/03/2021 06:13 AM    LDL,Direct 102 (H) 01/07/2021 11:49 AM    LDL, calculated 102 (H) 08/03/2021 06:13 AM    Triglyceride 120 08/03/2021 06:13 AM    CHOL/HDL Ratio 3.2 08/03/2021 06:13 AM     Lab Results   Component Value Date/Time    ALT (SGPT) 32 12/02/2021 08:48 PM    Alk. phosphatase 103 12/02/2021 08:48 PM    Bilirubin, total 0.4 12/02/2021 08:48 PM    Albumin 3.4 (L) 12/02/2021 08:48 PM    Protein, total 7.4 12/02/2021 08:48 PM    PLATELET 398 56/91/3671 08:48 PM        CT Results (maximum last 3): Results from [place] encounter on 01/19/22    CT SINUSES WO CONT    Narrative  EXAM: CT SINUSES WO CONT    INDICATION: Chronic pansinusitis    COMPARISON: None. CONTRAST: None. TECHNIQUE:  Multislice helical CT of the paranasal sinuses was performed in the axial plane  without intravenous contrast administration. Coronal and sagittal reformations  were generated. CT dose reduction was achieved through use of a standardized  protocol tailored for this examination and automatic exposure control for dose  modulation. FINDINGS:  The patient's head is slightly tilted within the scanner gantry. There is normal  aeration of the left maxillary sinus. A small mucous retention cyst/polyp is  noted in the anterior aspect of the right maxillary sinus (see bone axial image  209). There is patency of the ostiomeatal complexes bilaterally (see coronal  images 41 and 42). There is normal aeration of the frontal sinus. As seen on  bone window axial image 115, a small (0.7 x 0.3 cm) soft tissue density is noted  within the left lateral recess of the sphenoid sinus. This is suggestive of a  mucous retention cyst/polyp.  As seen on bone window axial image 104, a very  small (0.5 cm by 0.4 cm soft tissue density is noted within the left side of the  ethmoid sinus. This may represent a small mucous retention cyst/polyp. There is  no evidence of abnormal thickening of the walls of the sinuses to suggest  chronic sinusitis. There is prominence of the left inferior turbinate. As seen on axial image 133  and coronal image 47, a small torus palatinus is present. The mastoid air cells  are normally aerated bilaterally. Impression  1. Normal aeration of the frontal and left maxillary sinuses. Patency of the  ostiomeatal complexes of both maxillary sinuses. 2. Presence of small mucous retention cysts within the anterior aspect of the  right maxillary sinus, left side of the ethmoid sinus as well as the lateral  recess of the left side of the sphenoid sinus. 3. Relative prominence of the left inferior turbinate. 4. Presence of a small torus palatinus. 5. Normal aeration of the mastoid air cells bilaterally. Results from [place] encounter on 12/02/21    CT HEAD WO CONT    Narrative  EXAM: CT HEAD WO CONT    INDICATION: HA, sinusitus    COMPARISON: None. CONTRAST: None. TECHNIQUE: Unenhanced CT of the head was performed using 5 mm images. Brain and  bone windows were generated. Coronal and sagittal reformats. CT dose reduction  was achieved through use of a standardized protocol tailored for this  examination and automatic exposure control for dose modulation. FINDINGS:  The ventricles and sulci are normal in size, shape and configuration. . There is  no significant white matter disease. There is no intracranial hemorrhage,  extra-axial collection, or mass effect. The basilar cisterns are open. No CT  evidence of acute infarct. Right choroidal fissure cyst, incidental finding. The bone windows demonstrate no abnormalities. The visualized portions of the  paranasal sinuses and mastoid air cells are clear.     Impression  No acute intracranial abnormality      MRI Results (maximum last 3):  Results from Hospital Encounter encounter on 02/16/22    MRA BRAIN WO CONT    Narrative  INDICATION: Dx: Pulsatile tinnitus of left ear [J99. A2 (ICD-10-CM)]    COMPARISON:  CT December 2, 2021    TECHNIQUE:  3-D time-of-flight MRA of the brain was performed. 2-D  time-of-flight multiplanar MRV of the head was performed. Multiplanar  reconstructions were obtained. Imaging was performed on an open MRI. FINDINGS:    Posterior Circulation:  No flow limiting stenosis or occlusion. Anterior Circulation:  No flow limiting stenosis or occlusion. Additional Comments:  No evidence of aneurysm or vascular malformation. No  abnormal arterial flow related signal in the left internal auditory canal.    No evidence of venous sinus thrombosis. The transverse sinuses, sigmoid sinuses,  and proximal internal jugular veins are patent. There are mild distal transverse  sinus stenoses    Impression  1. No flow-limiting stenosis, intracranial aneurysm, or abnormal vascularity in  the region of the left internal auditory canal.  2. No evidence of venous sinus thrombosis. Mild bilateral distal transverse  sinus stenoses. MRV BRAIN WO CONT    Narrative  INDICATION: Dx: Pulsatile tinnitus of left ear [id number]. A2 (ICD-10-CM)]    COMPARISON:  CT December 2, 2021    TECHNIQUE:  3-D time-of-flight MRA of the brain was performed. 2-D  time-of-flight multiplanar MRV of the head was performed. Multiplanar  reconstructions were obtained. Imaging was performed on an open MRI. FINDINGS:    Posterior Circulation:  No flow limiting stenosis or occlusion. Anterior Circulation:  No flow limiting stenosis or occlusion. Additional Comments:  No evidence of aneurysm or vascular malformation. No  abnormal arterial flow related signal in the left internal auditory canal.    No evidence of venous sinus thrombosis. The transverse sinuses, sigmoid sinuses,  and proximal internal jugular veins are patent.  There are mild distal transverse  sinus stenoses    Impression  1. No flow-limiting stenosis, intracranial aneurysm, or abnormal vascularity in  the region of the left internal auditory canal.  2. No evidence of venous sinus thrombosis. Mild bilateral distal transverse  sinus stenoses. PET Results (maximum last 3): No results found for this or any previous visit. Assessment and Plan   Diagnoses and all orders for this visit:    1. Pulsatile tinnitus of both ears    2. IIH (idiopathic intracranial hypertension)    3. Chronic migraine w/o aura, not intractable, w/o stat migr    4. Intracranial vascular stenosis    Other orders  -     topiramate (TOPAMAX) 25 mg tablet; Take 1 Tablet by mouth two (2) times a day. -     ondansetron (ZOFRAN ODT) 4 mg disintegrating tablet; Take 1 Tablet by mouth every eight (8) hours as needed for Nausea (with headache). 43-year-old woman presenting with daily headaches. I suspect she has more than 1 condition ongoing. She clearly has chronic migraine with daily symptoms. I also suspect she has likely increased intracranial hypertensive headaches. She has pulsatile tinnitus consistent with the MRV findings. She sees ophthalmology next week after which if there is any evidence of optic disc edema I am going to recommend a spinal tap be done. I discussed this with her in advance. Depending on those results I would start her on medication. In the interim I am going to start treating her migraines with very low-dose topiramate. I do not anticipate elimination of headaches. I am not going to be aggressive with topiramate at this time until after she has her optimal logical evaluation and possibly spinal tap. I want her to continue seeing psychiatry and her other doctors. We spoke at length about her condition. There is nothing life-threatening ongoing here. However, main concern would be potential threat to her vision if her condition is undiagnosed and/or untreated. She understands the plan.   I will see her in 6 months however we will be in touch when she sees ophthalmology and make additional plans and recommendations based on that. 45 minutes of time was taken in total today reviewing the medical record to include personal review of the ophthalmological notes externally, neuroimaging, extensive face-to-face time discussing her condition and plans, and time completing documentation today. I reviewed and decided to continue the current medications. This clinical note was dictated with an electronic dictation software that can make unintentional errors. If there are any questions, please contact me directly for clarification. Thank you for giving me the opportunity to assist in the care of Ms. Bria Menjivar. If you have questions, please do not hesitate to contact me.         Sincerely,      [address] [facility], DO  Neurologist  Brain Injury Medicine  Diplomate CARL

## 2022-04-01 NOTE — PROGRESS NOTES
Chief Complaint   Patient presents with    Neurologic Problem     referred  by Dr. Yaz Corbett Migraine       Referred by: DR Raymond Dodson / Meet Hooks is a 28-year-old woman referred to me from interventional for headaches. I spoke with her personally. I also reviewed the neuroimaging. It sounds like she has had headaches ever since she was a child. Mother has migraines. She has many types of headaches. Her most common type of headache usually is posterior gradually becoming diffuse more on the left sometimes throbbing with nausea light sensitivity. As of August 2021 she began to have pulsatile tinnitus of both sides more on the left. She has gone to the ER for headaches. Normal head CT. She was referred to neuro interventional who has identified intracranial vascular stenosis. She sees ophthalmology next week for formal evaluation. Additionally, she has had a hard time losing weight. She struggles with anxiety and PTSD on various psychotropics. No loss of vision no double vision. She is already enrolled in the weight loss clinic. Review of Systems   Eyes: Negative for double vision. Neurological: Positive for headaches. All other systems reviewed and are negative.       Past Medical History:   Diagnosis Date    Anxiety     Chronic pain     Depression     Endometriosis     Headache     Lupus ([facility] [address] 2017    Migraines     Patella-femoral syndrome      Family History   Problem Relation Age of Onset    Hypertension Mother     Arthritis-rheumatoid Mother     Hypertension Father     Arthritis-rheumatoid Brother      Social History     Socioeconomic History    Marital status: SINGLE     Spouse name: Not on file    Number of children: Not on file    Years of education: Not on file    Highest education level: Not on file   Occupational History    Not on file   Tobacco Use    Smoking status: Never Smoker    Smokeless tobacco: Never Used   Vaping Use    Vaping Use: Never used Substance and Sexual Activity    Alcohol use: Yes     Comment: social    Drug use: Yes     Frequency: 1.0 times per week     Types: Marijuana     Comment: 1x/wk for anxiety and occas pain    Sexual activity: Not Currently   Other Topics Concern    Not on file   Social History Narrative    Not on file     Social Determinants of Health     Financial Resource Strain:     Difficulty of Paying Living Expenses: Not on file   Food Insecurity:     Worried About Running Out of Food in the Last Year: Not on file    Korin of Food in the Last Year: Not on file   Transportation Needs:     Lack of Transportation (Medical): Not on file    Lack of Transportation (Non-Medical): Not on file   Physical Activity:     Days of Exercise per Week: Not on file    Minutes of Exercise per Session: Not on file   Stress:     Feeling of Stress : Not on file   Social Connections:     Frequency of Communication with Friends and Family: Not on file    Frequency of Social Gatherings with Friends and Family: Not on file    Attends Mandaeism Services: Not on file    Active Member of [address] [place] [address] or Organizations: Not on file    Attends Club or Organization Meetings: Not on file    Marital Status: Not on file   Intimate Partner Violence:     Fear of Current or Ex-Partner: Not on file    Emotionally Abused: Not on file    Physically Abused: Not on file    Sexually Abused: Not on file   Housing Stability:     Unable to Pay for Housing in the Last Year: Not on file    Number of [place] in the Last Year: Not on file    Unstable Housing in the Last Year: Not on file     Current Outpatient Medications   Medication Sig    diazePAM (VALIUM) 5 mg tablet     ketorolac (TORADOL) 10 mg tablet 10 mg.    topiramate (TOPAMAX) 25 mg tablet Take 1 Tablet by mouth two (2) times a day.  ondansetron (ZOFRAN ODT) 4 mg disintegrating tablet Take 1 Tablet by mouth every eight (8) hours as needed for Nausea (with headache).     busPIRone (BUSPAR) 5 mg tablet Take 5 mg by mouth two (2) times a day.  ibuprofen (MOTRIN) 600 mg tablet 600 mg.  Blisovi Fe 1/20, 28, 1 mg-20 mcg (21)/75 mg (7) tab     FLUoxetine (PROzac) 40 mg capsule Take 1 Capsule by mouth daily. Indications: major depressive disorder    promethazine (PHENERGAN) 25 mg tablet 25 mg.  ubrogepant Gissell Segura) 50 mg tablet Take 1 Tablet by mouth daily as needed for Migraine.  trimethoprim-sulfamethoxazole (BACTRIM DS, SEPTRA DS) 160-800 mg per tablet Take 1 Tablet by mouth two (2) times a day. Indications: urinary tract infection due to E. coli bacteria (Patient not taking: Reported on 11/9/2021)     No current facility-administered medications for this visit. Allergies   Allergen Reactions    Pcn [Penicillins] Hives    Plaquenil [Hydroxychloroquine] Rash     Rash and oral ulcers         Neurologic Exam     Mental Status   Oriented to person, place, and time. Cranial Nerves   Cranial nerves II through XII intact. Motor Exam   Muscle bulk: normal    Strength   Strength 5/5 throughout. Sensory Exam   Light touch normal.     Gait, Coordination, and Reflexes     Gait  Gait: normal    Physical Exam  Vitals and nursing note reviewed. Constitutional:       Appearance: Normal appearance. She is obese. Cardiovascular:      Rate and Rhythm: Normal rate. Pulmonary:      Effort: Pulmonary effort is normal.   Neurological:      Mental Status: She is alert and oriented to person, place, and time. Gait: Gait is intact.       Deep Tendon Reflexes: Strength normal.       Visit Vitals  BP (!) 128/90   Pulse (!) 102   Resp 20   Ht 5' 1\" (1.549 m)   Wt 199 lb (90.3 kg)   SpO2 98%   BMI 37.60 kg/m²       Lab Results   Component Value Date/Time    WBC 12.6 (H) 12/02/2021 08:48 PM    HGB 12.1 12/02/2021 08:48 PM    HCT 38.6 12/02/2021 08:48 PM    PLATELET 303 55/87/4816 08:48 PM    MCV 83.9 12/02/2021 08:48 PM     Lab Results   Component Value Date/Time    Cholesterol, total 184 08/03/2021 06:13 AM    HDL Cholesterol 58 08/03/2021 06:13 AM    LDL,Direct 102 (H) 01/07/2021 11:49 AM    LDL, calculated 102 (H) 08/03/2021 06:13 AM    Triglyceride 120 08/03/2021 06:13 AM    CHOL/HDL Ratio 3.2 08/03/2021 06:13 AM     Lab Results   Component Value Date/Time    ALT (SGPT) 32 12/02/2021 08:48 PM    Alk. phosphatase 103 12/02/2021 08:48 PM    Bilirubin, total 0.4 12/02/2021 08:48 PM    Albumin 3.4 (L) 12/02/2021 08:48 PM    Protein, total 7.4 12/02/2021 08:48 PM    PLATELET 154 78/18/5782 08:48 PM        CT Results (maximum last 3): Results from [place] encounter on 01/19/22    CT SINUSES WO CONT    Narrative  EXAM: CT SINUSES WO CONT    INDICATION: Chronic pansinusitis    COMPARISON: None. CONTRAST: None. TECHNIQUE:  Multislice helical CT of the paranasal sinuses was performed in the axial plane  without intravenous contrast administration. Coronal and sagittal reformations  were generated. CT dose reduction was achieved through use of a standardized  protocol tailored for this examination and automatic exposure control for dose  modulation. FINDINGS:  The patient's head is slightly tilted within the scanner gantry. There is normal  aeration of the left maxillary sinus. A small mucous retention cyst/polyp is  noted in the anterior aspect of the right maxillary sinus (see bone axial image  209). There is patency of the ostiomeatal complexes bilaterally (see coronal  images 41 and 42). There is normal aeration of the frontal sinus. As seen on  bone window axial image 115, a small (0.7 x 0.3 cm) soft tissue density is noted  within the left lateral recess of the sphenoid sinus. This is suggestive of a  mucous retention cyst/polyp. As seen on bone window axial image 104, a very  small (0.5 cm by 0.4 cm soft tissue density is noted within the left side of the  ethmoid sinus. This may represent a small mucous retention cyst/polyp.  There is  no evidence of abnormal thickening of the walls of the sinuses to suggest  chronic sinusitis. There is prominence of the left inferior turbinate. As seen on axial image 133  and coronal image 47, a small torus palatinus is present. The mastoid air cells  are normally aerated bilaterally. Impression  1. Normal aeration of the frontal and left maxillary sinuses. Patency of the  ostiomeatal complexes of both maxillary sinuses. 2. Presence of small mucous retention cysts within the anterior aspect of the  right maxillary sinus, left side of the ethmoid sinus as well as the lateral  recess of the left side of the sphenoid sinus. 3. Relative prominence of the left inferior turbinate. 4. Presence of a small torus palatinus. 5. Normal aeration of the mastoid air cells bilaterally. Results from [place] encounter on 12/02/21    CT HEAD WO CONT    Narrative  EXAM: CT HEAD WO CONT    INDICATION: HA, sinusitus    COMPARISON: None. CONTRAST: None. TECHNIQUE: Unenhanced CT of the head was performed using 5 mm images. Brain and  bone windows were generated. Coronal and sagittal reformats. CT dose reduction  was achieved through use of a standardized protocol tailored for this  examination and automatic exposure control for dose modulation. FINDINGS:  The ventricles and sulci are normal in size, shape and configuration. . There is  no significant white matter disease. There is no intracranial hemorrhage,  extra-axial collection, or mass effect. The basilar cisterns are open. No CT  evidence of acute infarct. Right choroidal fissure cyst, incidental finding. The bone windows demonstrate no abnormalities. The visualized portions of the  paranasal sinuses and mastoid air cells are clear. Impression  No acute intracranial abnormality      MRI Results (maximum last 3): Results from [place] encounter on 02/16/22    MRA BRAIN WO CONT    Narrative  INDICATION: Dx: Pulsatile tinnitus of left ear [S61. A2 (ICD-10-CM)]    COMPARISON:  CT December 2, 2021    TECHNIQUE:  3-D time-of-flight MRA of the brain was performed. 2-D  time-of-flight multiplanar MRV of the head was performed. Multiplanar  reconstructions were obtained. Imaging was performed on an open MRI. FINDINGS:    Posterior Circulation:  No flow limiting stenosis or occlusion. Anterior Circulation:  No flow limiting stenosis or occlusion. Additional Comments:  No evidence of aneurysm or vascular malformation. No  abnormal arterial flow related signal in the left internal auditory canal.    No evidence of venous sinus thrombosis. The transverse sinuses, sigmoid sinuses,  and proximal internal jugular veins are patent. There are mild distal transverse  sinus stenoses    Impression  1. No flow-limiting stenosis, intracranial aneurysm, or abnormal vascularity in  the region of the left internal auditory canal.  2. No evidence of venous sinus thrombosis. Mild bilateral distal transverse  sinus stenoses. MRV BRAIN WO CONT    Narrative  INDICATION: Dx: Pulsatile tinnitus of left ear [id number]. A2 (ICD-10-CM)]    COMPARISON:  CT December 2, 2021    TECHNIQUE:  3-D time-of-flight MRA of the brain was performed. 2-D  time-of-flight multiplanar MRV of the head was performed. Multiplanar  reconstructions were obtained. Imaging was performed on an open MRI. FINDINGS:    Posterior Circulation:  No flow limiting stenosis or occlusion. Anterior Circulation:  No flow limiting stenosis or occlusion. Additional Comments:  No evidence of aneurysm or vascular malformation. No  abnormal arterial flow related signal in the left internal auditory canal.    No evidence of venous sinus thrombosis. The transverse sinuses, sigmoid sinuses,  and proximal internal jugular veins are patent. There are mild distal transverse  sinus stenoses    Impression  1.  No flow-limiting stenosis, intracranial aneurysm, or abnormal vascularity in  the region of the left internal auditory canal.  2. No evidence of venous sinus thrombosis. Mild bilateral distal transverse  sinus stenoses. PET Results (maximum last 3): No results found for this or any previous visit. Assessment and Plan   Diagnoses and all orders for this visit:    1. Pulsatile tinnitus of both ears    2. IIH (idiopathic intracranial hypertension)    3. Chronic migraine w/o aura, not intractable, w/o stat migr    4. Intracranial vascular stenosis    Other orders  -     topiramate (TOPAMAX) 25 mg tablet; Take 1 Tablet by mouth two (2) times a day. -     ondansetron (ZOFRAN ODT) 4 mg disintegrating tablet; Take 1 Tablet by mouth every eight (8) hours as needed for Nausea (with headache). 73-year-old woman presenting with daily headaches. I suspect she has more than 1 condition ongoing. She clearly has chronic migraine with daily symptoms. I also suspect she has likely increased intracranial hypertensive headaches. She has pulsatile tinnitus consistent with the MRV findings. She sees ophthalmology next week after which if there is any evidence of optic disc edema I am going to recommend a spinal tap be done. I discussed this with her in advance. Depending on those results I would start her on medication. In the interim I am going to start treating her migraines with very low-dose topiramate. I do not anticipate elimination of headaches. I am not going to be aggressive with topiramate at this time until after she has her optimal logical evaluation and possibly spinal tap. I want her to continue seeing psychiatry and her other doctors. We spoke at length about her condition. There is nothing life-threatening ongoing here. However, main concern would be potential threat to her vision if her condition is undiagnosed and/or untreated. She understands the plan. I will see her in 6 months however we will be in touch when she sees ophthalmology and make additional plans and recommendations based on that.   [address] [facility] minutes of time was taken in total today reviewing the medical record to include personal review of the ophthalmological notes externally, neuroimaging, extensive face-to-face time discussing her condition and plans, and time completing documentation today. I reviewed and decided to continue the current medications. This clinical note was dictated with an electronic dictation software that can make unintentional errors. If there are any questions, please contact me directly for clarification. A notice of this visit/encounter being completed has been sent electronically to the patient's PCP and/or referring provider.      [address] [place] [address], [facility] [address]  Diplomate ABPN

## 2022-04-11 ENCOUNTER — DOCUMENTATION ONLY (OUTPATIENT)
Dept: NEUROSURGERY | Age: 27
End: 2022-04-11

## 2022-04-11 NOTE — PROGRESS NOTES
I received office notes from Ivette Lopez dated April 6, 2022. She reports that this patient does in fact have intracranial hypertension with bilateral disc edema. Fortunately her vision is not threatened at this time. Dr. Bertha Huff is recommending LP with careful opening pressure. She is started on acetazolamide but may require adjustment to therapy based on the LP results. Dr. Bertha Huff is also recommending weight loss and testing for sleep apnea. The patient is already been referred to the [place] [address] weight loss clinic. I defer medical management of the intracranial hypertension to Dr. Bertha Huff and Dr. Patrica Michelle (neurology). We will see her back in October 2022 after she has lost weight and been on acetazolamide. This is an initial diagnosis and the patient has not been treated medically prior to this time.

## 2022-04-29 ENCOUNTER — TELEPHONE (OUTPATIENT)
Dept: NEUROLOGY | Age: 27
End: 2022-04-29

## 2022-04-29 ENCOUNTER — HOSPITAL ENCOUNTER (OUTPATIENT)
Dept: GENERAL RADIOLOGY | Age: 27
Discharge: HOME | End: 2022-04-29
Attending: PSYCHIATRY & NEUROLOGY
Payer: MEDICAID

## 2022-04-29 DIAGNOSIS — H47.11 PAPILLEDEMA ASSOCIATED WITH INCREASED INTRACRANIAL PRESSURE: ICD-10-CM

## 2022-04-29 PROCEDURE — 62328 DX LMBR SPI PNXR W/FLUOR/CT: CPT

## 2022-04-29 RX ORDER — TOPIRAMATE 50 MG/1
50 TABLET, FILM COATED ORAL 2 TIMES DAILY
Qty: 60 TABLET | Refills: 5 | Status: SHIPPED | OUTPATIENT
Start: 2022-04-29 | End: 2022-07-07

## 2022-04-29 RX ORDER — SODIUM BICARBONATE 42 MG/ML
2 INJECTION, SOLUTION INTRAVENOUS
Status: DISPENSED | OUTPATIENT
Start: 2022-04-29 | End: 2022-04-29

## 2022-04-29 NOTE — PROGRESS NOTES
Pt stated she \"had a wound from a suicide attempt\" on her arm. This RN removed old dressing. A laceration approx 3 in\" long appears to be healing well. No redness, swelling or other issues were noted. Lac had 2 butterfly type strips in center to approximate edges. RN placed clean 2x2 over wound and gave pt supplies to keep clean at home. Pt states she is \"feeling better and following up with counseling\". Pt is resting comfortably in stretcher. Pt discharge instructions were given to pt by  RN. Opportunities for questions and concerns were provided. Pt verbalized understanding of any  medication use and follow-up. LP site is CDI, pt encouraged to hydrate. Pt wheled off unit in no signs of distress, steady gait noted as pt ambulated to mother's care.

## 2022-04-29 NOTE — TELEPHONE ENCOUNTER
Okay I reviewed the lumbar puncture with a slightly elevated opening pressure of 21. She is already on low-dose topiramate and I would like to have her increase this now to 50 mg twice a day. I will send the medication to the pharmacy with the new dose. Side effects might be numbness and tingling in the face and arms and legs which is normal and not serious. She might have taste changes as well which could lead to weight loss.

## 2022-04-29 NOTE — DISCHARGE INSTRUCTIONS
Patient Education     Lumbar Puncture: After Your Visit  Your Care Instructions  A lumbar puncture (also called a spinal tap) is a test to check the fluid that surrounds and protects your spinal cord and brain. Your doctor may have done this test to look for an infection. In some cases, a lumbar puncture is done to release pressure from too much fluid or to look for diseases such as multiple sclerosis. The fluid that was taken is often sent to a lab for different tests. Your doctor may get some answers right away, but other answers take hours to days. Your doctor will call you with the results. You may feel tired or have a mild backache or a headache after the test. Some people have trouble sleeping for 1 or 2 days. Follow-up care is a key part of your treatment and safety. Be sure to make and go to all appointments, and call your doctor if you are having problems. Its also a good idea to know your test results and keep a list of the medicines you take. How can you care for yourself at home? · Drink plenty of liquids in the next few hours. This may prevent a headache or keep a headache from being severe. · Your doctor may tell you to lie flat in bed for 1 to 4 hours. This may prevent a headache. · Get plenty of rest.  · If your doctor prescribed antibiotics, take them as directed. Do not stop taking them just because you feel better. You need to take the full course of antibiotics. · Take anti-inflammatory medicines to reduce a headache or backache. These include ibuprofen (Advil, Motrin) and naproxen (Aleve). Read and follow all instructions on the label. When should you call for help? Call your doctor now or seek immediate medical care if:  · You have a fever with a stiff neck or a severe headache. · You have any drainage or bleeding from the site of the puncture. · You feel numb or lose strength below the puncture site.   Watch closely for changes in your health, and be sure to contact your doctor if:  · You do not get better as expected. Where can you learn more? Go to [employer]  Enter B775 in the search box to learn more about \"Lumbar Puncture: After Your Visit. \"   © 1439-2510 [employer]. Care instructions adapted under license by [employer] (which disclaims liability or warranty for this information). This care instruction is for use with your licensed healthcare professional. If you have questions about a medical condition or this instruction, always ask your healthcare professional. [place] [address] any warranty or liability for your use of this information.   Content Version: 8.3.825700; Last Revised: September 13, 2011

## 2022-04-29 NOTE — H&P
Radiology History and Physical    Patient: Virgina Bence 23 y.o. female       Chief Complaint: No chief complaint on file. History of Present Illness: 23year old woman with papilledema, presents for LP with opening pressure measurement. History:    Past Medical History:   Diagnosis Date    Anxiety     Chronic pain     Depression     Endometriosis     Headache     Lupus ([facility] [address] 2017    Migraines     Patella-femoral syndrome      Family History   Problem Relation Age of Onset    Hypertension Mother     Arthritis-rheumatoid Mother     Hypertension Father     Arthritis-rheumatoid Brother      Social History     Socioeconomic History    Marital status: SINGLE     Spouse name: Not on file    Number of children: Not on file    Years of education: Not on file    Highest education level: Not on file   Occupational History    Not on file   Tobacco Use    Smoking status: Never Smoker    Smokeless tobacco: Never Used   Vaping Use    Vaping Use: Never used   Substance and Sexual Activity    Alcohol use: Yes     Comment: social    Drug use: Yes     Frequency: 1.0 times per week     Types: Marijuana     Comment: 1x/wk for anxiety and occas pain    Sexual activity: Not Currently   Other Topics Concern    Not on file   Social History Narrative    Not on file     Social Determinants of Health     Financial Resource Strain:     Difficulty of Paying Living Expenses: Not on file   Food Insecurity:     Worried About Running Out of Food in the Last Year: Not on file    Korin of Food in the Last Year: Not on file   Transportation Needs:     Lack of Transportation (Medical): Not on file    Lack of Transportation (Non-Medical):  Not on file   Physical Activity:     Days of Exercise per Week: Not on file    Minutes of Exercise per Session: Not on file   Stress:     Feeling of Stress : Not on file   Social Connections:     Frequency of Communication with Friends and Family: Not on file    Frequency of Social Gatherings with Friends and Family: Not on file    Attends Buddhist Services: Not on file    Active Member of Clubs or Organizations: Not on file    Attends Club or Organization Meetings: Not on file    Marital Status: Not on file   Intimate Partner Violence:     Fear of Current or Ex-Partner: Not on file    Emotionally Abused: Not on file    Physically Abused: Not on file    Sexually Abused: Not on file   Housing Stability:     Unable to Pay for Housing in the Last Year: Not on file    Number of [place] in the Last Year: Not on file    Unstable Housing in the Last Year: Not on file       Allergies: Allergies   Allergen Reactions    Pcn [Penicillins] Hives    Plaquenil [Hydroxychloroquine] Rash     Rash and oral ulcers       Current Medications:  Current Outpatient Medications   Medication Sig    diazePAM (VALIUM) 5 mg tablet     ketorolac (TORADOL) 10 mg tablet 10 mg.  promethazine (PHENERGAN) 25 mg tablet 25 mg.  topiramate (TOPAMAX) 25 mg tablet Take 1 Tablet by mouth two (2) times a day.  ondansetron (ZOFRAN ODT) 4 mg disintegrating tablet Take 1 Tablet by mouth every eight (8) hours as needed for Nausea (with headache).  busPIRone (BUSPAR) 5 mg tablet Take 5 mg by mouth two (2) times a day.  ibuprofen (MOTRIN) 600 mg tablet 600 mg.  Blisovi Fe 1/20, 28, 1 mg-20 mcg (21)/75 mg (7) tab     ubrogepant (Ubrelvy) 50 mg tablet Take 1 Tablet by mouth daily as needed for Migraine.  FLUoxetine (PROzac) 40 mg capsule Take 1 Capsule by mouth daily. Indications: major depressive disorder    trimethoprim-sulfamethoxazole (BACTRIM DS, SEPTRA DS) 160-800 mg per tablet Take 1 Tablet by mouth two (2) times a day.  Indications: urinary tract infection due to E. coli bacteria (Patient not taking: Reported on 11/9/2021)     Current Facility-Administered Medications   Medication Dose Route Frequency    sodium bicarbonate (4.2%) injection 84 mg  2 mL SubCUTAneous RAD ONCE        Physical Exam:  There were no vitals taken for this visit. GENERAL: alert, cooperative, no distress, appears stated age,   LUNG: Nonlabored respiration on room air  HEART: regular rate and rhythm    Alerts:    Hospital Problems  Date Reviewed: 11/16/2021    None          Laboratory:    No results for input(s): HGB, HCT, WBC, PLT, INR, BUN, CREA, K, CRCLT, HGBEXT, HCTEXT, PLTEXT, INREXT in the last 72 hours. No lab exists for component: PTT, PT      Plan of Care/Planned Procedure:  Risks, benefits, and alternatives reviewed with patient and she agrees to proceed with the procedure.      Fluoroscopically guided lumbar puncture    Majo Hamm MD  Interventional Radiology  [facility], [facility].  12:36 PM, 4/29/2022

## 2022-05-01 ENCOUNTER — PATIENT MESSAGE (OUTPATIENT)
Dept: PRIMARY CARE CLINIC | Age: 27
End: 2022-05-01

## 2022-05-18 ENCOUNTER — OFFICE VISIT (OUTPATIENT)
Dept: PRIMARY CARE CLINIC | Age: 27
End: 2022-05-18
Payer: MEDICAID

## 2022-05-18 ENCOUNTER — HOSPITAL ENCOUNTER (OUTPATIENT)
Dept: LAB | Age: 27
Discharge: HOME | End: 2022-05-18
Payer: MEDICAID

## 2022-05-18 VITALS
BODY MASS INDEX: 36.63 KG/M2 | DIASTOLIC BLOOD PRESSURE: 74 MMHG | TEMPERATURE: 97.1 F | OXYGEN SATURATION: 98 % | HEART RATE: 97 BPM | HEIGHT: 61 IN | RESPIRATION RATE: 18 BRPM | WEIGHT: 194 LBS | SYSTOLIC BLOOD PRESSURE: 134 MMHG

## 2022-05-18 DIAGNOSIS — Z01.419 WELL FEMALE EXAM WITH ROUTINE GYNECOLOGICAL EXAM: ICD-10-CM

## 2022-05-18 DIAGNOSIS — J45.20 MILD INTERMITTENT ASTHMA WITHOUT COMPLICATION: Primary | ICD-10-CM

## 2022-05-18 DIAGNOSIS — F41.9 ANXIETY AND DEPRESSION: ICD-10-CM

## 2022-05-18 DIAGNOSIS — Z11.59 NEED FOR HEPATITIS C SCREENING TEST: ICD-10-CM

## 2022-05-18 DIAGNOSIS — F32.A ANXIETY AND DEPRESSION: ICD-10-CM

## 2022-05-18 DIAGNOSIS — E55.9 VITAMIN D DEFICIENCY: ICD-10-CM

## 2022-05-18 DIAGNOSIS — G43.009 MIGRAINE WITHOUT AURA AND WITHOUT STATUS MIGRAINOSUS, NOT INTRACTABLE: ICD-10-CM

## 2022-05-18 DIAGNOSIS — Z00.00 PHYSICAL EXAM: ICD-10-CM

## 2022-05-18 DIAGNOSIS — E66.01 SEVERE OBESITY (HCC): ICD-10-CM

## 2022-05-18 PROBLEM — F60.3 BORDERLINE PERSONALITY DISORDER (HCC): Status: ACTIVE | Noted: 2022-05-18

## 2022-05-18 PROBLEM — F41.1 GAD (GENERALIZED ANXIETY DISORDER): Status: RESOLVED | Noted: 2021-01-07 | Resolved: 2022-05-18

## 2022-05-18 PROCEDURE — 88175 CYTOPATH C/V AUTO FLUID REDO: CPT

## 2022-05-18 PROCEDURE — 87624 HPV HI-RISK TYP POOLED RSLT: CPT

## 2022-05-18 PROCEDURE — 99214 OFFICE O/P EST MOD 30 MIN: CPT | Performed by: INTERNAL MEDICINE

## 2022-05-18 RX ORDER — GABAPENTIN 300 MG/1
CAPSULE ORAL
COMMUNITY
Start: 2022-05-11

## 2022-05-18 RX ORDER — ALBUTEROL SULFATE 90 UG/1
1 AEROSOL, METERED RESPIRATORY (INHALATION)
Qty: 18 G | Refills: 0 | Status: SHIPPED | OUTPATIENT
Start: 2022-05-18 | End: 2022-06-17

## 2022-05-18 RX ORDER — TRAZODONE HYDROCHLORIDE 50 MG/1
50 TABLET ORAL AS NEEDED
COMMUNITY
Start: 2022-04-26

## 2022-05-18 RX ORDER — HYDROXYZINE PAMOATE 50 MG/1
CAPSULE ORAL
COMMUNITY
Start: 2022-04-26

## 2022-05-18 NOTE — PROGRESS NOTES
[address] [facility] (: 1995) is a 23 y.o. female, established patient, here for evaluation of the following chief complaint(s):  Physical (PAP, test for asthma )     Written by Michelle Florian, as dictated by Dr. Austin Cranker, MD.      ASSESSMENT/PLAN:  Below is the assessment and plan developed based on review of pertinent history, physical exam, labs, studies, and medications. 1. Mild intermittent asthma without complication  I rx'd a prn albuterol inhaler to use prn before exercise. Potential side effects were discussed. She can discuss referral to Pulmonology for PFT's with Dr. Julita Delgadillo. -     albuterol (PROVENTIL HFA, VENTOLIN HFA, PROAIR HFA) 90 mcg/actuation inhaler; Take 1 Puff by inhalation every six (6) hours as needed for Wheezing for up to 30 days. , Normal, Disp-18 g, R-0 sent to pharmacy. 2. Migraine without aura and without status migrainosus, not intractable  Well controlled on Topamax 50 mg BID. Continue on current medication(s). 3. Well female exam with routine gynecological exam  Pap smear done in office today. She tolerated this well. -     PAP IG, APTIMA HPV AND RFX  ([id number]); Future    4. Anxiety and depression  Followed by Joann Easley NP (psychiatry). Continue with Prozac 40 mg daily, gabapentin 300 mg, and hydroxyzine 50 mg.     5. Physical exam  Physical exam normal today. Ordered fasting labs for patient to complete when fasting. Waiting on results. -     METABOLIC PANEL, COMPREHENSIVE; Future  -     CBC W/O DIFF; Future  -     LIPID PANEL; Future  -     TSH 3RD GENERATION; Future    6. Need for hepatitis C screening test  Will check hepatitis C AB.   -     HEPATITIS C AB; Future    7. Vitamin D deficiency  Recheck vitamin d level. Recommend otc vitamin d3 1000 iu daily. -     VITAMIN D, 25 HYDROXY; Future    8.  Severe obesity ([address]  Explained that she should be walking 5,000 steps daily to maintain conditioning and weight and increase to at least 10,000 steps to work on losing weight. SUBJECTIVE/OBJECTIVE:  HPI   The patient presents today for a follow-up visit and well woman exam. Her PCP is Dr. Norah Shepherd. She has a hx of asthma as a child and would like to be retested. She has noticed wheezing with activity and exercise. Also notes a FMHx of asthma. She is on Topamax 50 mg BID for migraine headaches. She has a hx of vitamin d deficiency, but does not take vitamin d supplement. She is followed by Esther Gonzalez NP (psychiatry) for depression and anxiety. She was hospitalized at [facility] in 04/2022 for 4 days after a suicide attempt and dx'd with borderline personality disorder. She is on Prozac 40 mg daily, gabapentin 300 mg and hydroxyzine 50 mg which have been working well. She is also in therapy which has been helping. She last had a Pap smear 4 years ago. She is on birth control pills. She is not sexually active. She notes that she was 135 lb when admitted to the hospital and now 194 lb. She has lost a lot of teeth due to lupus so her diet is very restrictive. Pain from lupus also limits her exercise. Patient Active Problem List   Diagnosis Code    Lupus ([facility] M32.9    Depression F32. A    History of eating disorder Z86.59    Pulsatile tinnitus of both ears H93. A3    Migraine without aura and without status migrainosus, not intractable G43.009    Anxiety and depression F41.9, F32. A    Borderline personality disorder ([facility] F60.3        Current Outpatient Medications on File Prior to Visit   Medication Sig Dispense Refill    traZODone (DESYREL) 50 mg tablet       topiramate (TOPAMAX) 50 mg tablet Take 1 Tablet by mouth two (2) times a day. 60 Tablet 5    ibuprofen (MOTRIN) 600 mg tablet 600 mg.  Blisovi Fe 1/20, 28, 1 mg-20 mcg (21)/75 mg (7) tab       FLUoxetine (PROzac) 40 mg capsule Take 1 Capsule by mouth daily.  Indications: major depressive disorder 30 Capsule 0    gabapentin (NEURONTIN) 300 mg capsule       hydrOXYzine pamoate (VISTARIL) 50 mg capsule       [DISCONTINUED] diazePAM (VALIUM) 5 mg tablet  (Patient not taking: Reported on 5/18/2022)      [DISCONTINUED] ketorolac (TORADOL) 10 mg tablet 10 mg. (Patient not taking: Reported on 5/18/2022)      [DISCONTINUED] promethazine (PHENERGAN) 25 mg tablet 25 mg. (Patient not taking: Reported on 5/18/2022)      [DISCONTINUED] ondansetron (ZOFRAN ODT) 4 mg disintegrating tablet Take 1 Tablet by mouth every eight (8) hours as needed for Nausea (with headache). (Patient not taking: Reported on 5/18/2022) 30 Tablet 1    [DISCONTINUED] busPIRone (BUSPAR) 5 mg tablet Take 5 mg by mouth two (2) times a day. (Patient not taking: Reported on 5/18/2022)      [DISCONTINUED] ubrogepant Kelsey Owen) 50 mg tablet Take 1 Tablet by mouth daily as needed for Migraine. (Patient not taking: Reported on 5/18/2022) 10 Tablet 1    [DISCONTINUED] trimethoprim-sulfamethoxazole (BACTRIM DS, SEPTRA DS) 160-800 mg per tablet Take 1 Tablet by mouth two (2) times a day. Indications: urinary tract infection due to E. coli bacteria (Patient not taking: Reported on 11/9/2021) 6 Tablet 0     No current facility-administered medications on file prior to visit.        Allergies   Allergen Reactions    Pcn [Penicillins] Hives    Kiwi Itching    Plaquenil [Hydroxychloroquine] Rash     Rash and oral ulcers       Past Medical History:   Diagnosis Date    Anxiety     Chronic pain     Depression     Endometriosis     Headache     Lupus ([facility] 2017    Migraines     Patella-femoral syndrome     Personality disorder ([facility] 2022       Past Surgical History:   Procedure Laterality Date    HX HEENT  2001    swallowed foreign body nickel       Family History   Problem Relation Age of Onset    Hypertension Mother     Arthritis-rheumatoid Mother     Hypertension Father     Arthritis-rheumatoid Brother        Social History     Socioeconomic History    Marital status: SINGLE     Spouse name: Not on file    Number of children: Not on file    Years of education: Not on file    Highest education level: Not on file   Occupational History    Not on file   Tobacco Use    Smoking status: Never Smoker    Smokeless tobacco: Never Used   Vaping Use    Vaping Use: Never used   Substance and Sexual Activity    Alcohol use: Yes     Comment: social    Drug use: Yes     Frequency: 1.0 times per week     Types: Marijuana     Comment: 1x/wk for anxiety and occas pain    Sexual activity: Not Currently   Other Topics Concern    Not on file   Social History Narrative    Not on file       No visits with results within 3 Month(s) from this visit. Latest known visit with results is:   Admission on 12/02/2021, Discharged on 12/02/2021   Component Date Value Ref Range Status    WBC 12/02/2021 12.6* 3.6 - 11.0 K/uL Final    RBC 12/02/2021 4.60  3.80 - 5.20 M/uL Final    HGB 12/02/2021 12.1  11.5 - 16.0 g/dL Final    HCT 12/02/2021 38.6  35.0 - 47.0 % Final    MCV 12/02/2021 83.9  80.0 - 99.0 FL Final    MCH 12/02/2021 26.3  26.0 - 34.0 PG Final    MCHC 12/02/2021 31.3  30.0 - 36.5 g/dL Final    RDW 12/02/2021 13.6  11.5 - 14.5 % Final    PLATELET 93/00/5047 592  150 - 400 K/uL Final    MPV 12/02/2021 11.2  8.9 - 12.9 FL Final    NRBC 12/02/2021 0.0  0  WBC Final    ABSOLUTE NRBC 12/02/2021 0.00  0.00 - 0.01 K/uL Final    NEUTROPHILS 12/02/2021 87* 32 - 75 % Final    LYMPHOCYTES 12/02/2021 12  12 - 49 % Final    MONOCYTES 12/02/2021 1* 5 - 13 % Final    EOSINOPHILS 12/02/2021 0  0 - 7 % Final    BASOPHILS 12/02/2021 0  0 - 1 % Final    IMMATURE GRANULOCYTES 12/02/2021 0  0.0 - 0.5 % Final    ABS. NEUTROPHILS 12/02/2021 10.8* 1.8 - 8.0 K/UL Final    ABS. LYMPHOCYTES 12/02/2021 1.5  0.8 - 3.5 K/UL Final    ABS. MONOCYTES 12/02/2021 0.2  0.0 - 1.0 K/UL Final    ABS. EOSINOPHILS 12/02/2021 0.0  0.0 - 0.4 K/UL Final    ABS.  BASOPHILS 12/02/2021 0.0  0.0 - 0.1 K/UL Final    ABS. IMM. GRANS. 12/02/2021 0.1* 0.00 - 0.04 K/UL Final    DF 12/02/2021 AUTOMATED    Final    Sodium 12/02/2021 141  136 - 145 mmol/L Final    Potassium 12/02/2021 3.9  3.5 - 5.1 mmol/L Final    Chloride 12/02/2021 103  97 - 108 mmol/L Final    CO2 12/02/2021 26  21 - 32 mmol/L Final    Anion gap 12/02/2021 12  5 - 15 mmol/L Final    Glucose 12/02/2021 103* 65 - 100 mg/dL Final    BUN 12/02/2021 9  6 - 20 MG/DL Final    Creatinine 12/02/2021 0.84  0.55 - 1.02 MG/DL Final    BUN/Creatinine ratio 12/02/2021 11* 12 - 20   Final    GFR est AA 12/02/2021 >60  >60 ml/min/1.73m2 Final    GFR est non-AA 12/02/2021 >60  >60 ml/min/1.73m2 Final    Estimated GFR is calculated using the IDMS-traceable Modification of Diet in Renal Disease (MDRD) Study equation, reported for both  Americans (GFRAA) and non- Americans (GFRNA), and normalized to 1.73m2 body surface area. The physician must decide which value applies to the patient.  Calcium 12/02/2021 9.1  8.5 - 10.1 MG/DL Final    Bilirubin, total 12/02/2021 0.4  0.2 - 1.0 MG/DL Final    ALT (SGPT) 12/02/2021 32  12 - 78 U/L Final    AST (SGOT) 12/02/2021 29  15 - 37 U/L Final    Alk.  phosphatase 12/02/2021 103  45 - 117 U/L Final    Protein, total 12/02/2021 7.4  6.4 - 8.2 g/dL Final    Albumin 12/02/2021 3.4* 3.5 - 5.0 g/dL Final    Globulin 12/02/2021 4.0  2.0 - 4.0 g/dL Final    A-G Ratio 12/02/2021 0.9* 1.1 - 2.2   Final    Pregnancy test,urine (POC) 12/02/2021 Negative  NEG   Final    Color 12/02/2021 YELLOW/STRAW    Final    Color Reference Range: Straw, Yellow or Dark Yellow    Appearance 12/02/2021 CLOUDY* CLEAR   Final    Specific gravity 12/02/2021 1.025  1.003 - 1.030   Final    pH (UA) 12/02/2021 6.0  5.0 - 8.0   Final    Protein 12/02/2021 Negative  NEG mg/dL Final    Glucose 12/02/2021 Negative  NEG mg/dL Final    Ketone 12/02/2021 Negative  NEG mg/dL Final    Bilirubin 12/02/2021 Negative  NEG   Final    Blood 12/02/2021 TRACE* NEG   Final    Urobilinogen 12/02/2021 0.2  0.2 - 1.0 EU/dL Final    Nitrites 12/02/2021 Negative  NEG   Final    Leukocyte Esterase 12/02/2021 LARGE* NEG   Final    WBC 12/02/2021   0 - 4 /hpf Final    RBC 12/02/2021 0-5  0 - 5 /hpf Final    Epithelial cells 12/02/2021 MANY* FEW /lpf Final    Epithelial cell category consists of squamous cells and /or transitional urothelial cells. Renal tubular cells, if present, are separately identified as such.  Bacteria 12/02/2021 2+* NEG /hpf Final    Urine culture hold 12/02/2021 Urine on hold in Microbiology dept for 2 days. If unpreserved urine is submitted, it cannot be used for addtional testing after 24 hours, recollection will be required. Final      Review of Systems   Constitutional: Negative for activity change, fatigue and unexpected weight change. HENT: Negative for congestion, hearing loss, rhinorrhea and sore throat. Eyes: Negative for discharge. Respiratory: Negative for cough, chest tightness and shortness of breath. Cardiovascular: Negative for leg swelling. Gastrointestinal: Negative for abdominal pain, constipation and diarrhea. Genitourinary: Negative for dysuria, flank pain, frequency and urgency. Musculoskeletal: Negative for arthralgias, back pain and myalgias. Skin: Negative for color change and rash. Neurological: Negative for dizziness, light-headedness and headaches. Psychiatric/Behavioral: Negative for dysphoric mood and sleep disturbance. The patient is not nervous/anxious. Visit Vitals  /74 (BP 1 Location: Left upper arm, BP Patient Position: Sitting)   Pulse 97   Temp 97.1 °F (36.2 °C) (Temporal)   Resp 18   Ht 5' 1\" (1.549 m)   Wt 194 lb (88 kg)   LMP  (LMP Unknown)   SpO2 98%   BMI 36.66 kg/m²      Physical Exam  Vitals and nursing note reviewed. Constitutional:       General: She is not in acute distress. Appearance: Normal appearance. She is obese.  She is not diaphoretic. HENT:      Right Ear: Tympanic membrane, ear canal and external ear normal.      Left Ear: Tympanic membrane, ear canal and external ear normal.      Mouth/Throat:      Mouth: Mucous membranes are moist.      Pharynx: Oropharynx is clear. Eyes:      General:         Right eye: No discharge. Left eye: No discharge. Extraocular Movements: Extraocular movements intact. Conjunctiva/sclera: Conjunctivae normal.   Neck:      Thyroid: No thyromegaly. Cardiovascular:      Rate and Rhythm: Normal rate and regular rhythm. Pulses: Normal pulses. Dorsalis pedis pulses are 2+ on the right side and 2+ on the left side. Pulmonary:      Effort: Pulmonary effort is normal.      Breath sounds: Normal breath sounds. No wheezing. Chest:      Comments: BREAST EXAM: breasts appear normal, no suspicious masses, no skin or nipple changes or axillary nodes   Abdominal:      General: Bowel sounds are normal. There is no distension. Palpations: Abdomen is soft. Tenderness: There is no abdominal tenderness. Genitourinary:     Comments: normal external genitalia, vulva, vagina, and cervix   Musculoskeletal:      Right lower leg: No edema. Left lower leg: No edema. Comments: B/L knees without crepitus   Lymphadenopathy:      Cervical: No cervical adenopathy. Neurological:      Deep Tendon Reflexes:      Reflex Scores:       Patellar reflexes are 2+ on the right side and 2+ on the left side. Psychiatric:         Mood and Affect: Mood and affect normal.       An electronic signature was used to authenticate this note.   -- Talia Mood

## 2022-05-18 NOTE — PROGRESS NOTES
1. \"Have you been to the ER, urgent care clinic since your last visit? Hospitalized since your last visit? \" Yes Where: [facility]    2. \"Have you seen or consulted any other health care providers outside of the [address] [place] [address] since your last visit? \" Yes Where: [facility]     3. For patients aged 39-70: Has the patient had a colonoscopy / FIT/ Cologuard? NA - based on age      If the patient is female:    4. For patients aged 41-77: Has the patient had a mammogram within the past 2 years? NA - based on age or sex      11. For patients aged 21-65: Has the patient had a pap smear? Yes - Care Gap present.  Rooming MA/LPN to request most recent results

## 2022-05-24 ENCOUNTER — PATIENT MESSAGE (OUTPATIENT)
Dept: PRIMARY CARE CLINIC | Age: 27
End: 2022-05-24

## 2022-05-26 ENCOUNTER — TELEPHONE (OUTPATIENT)
Dept: PRIMARY CARE CLINIC | Age: 27
End: 2022-05-26

## 2022-05-26 NOTE — TELEPHONE ENCOUNTER
Spoke to pt aware of lab results. At this time pt is unable to change diet due to dental issues, and will look into changing diet after.

## 2022-07-06 ENCOUNTER — TELEPHONE (OUTPATIENT)
Dept: NEUROLOGY | Age: 27
End: 2022-07-06

## 2022-07-07 RX ORDER — TOPIRAMATE 25 MG/1
75 TABLET ORAL 2 TIMES DAILY
Qty: 180 TABLET | Refills: 5 | Status: SHIPPED | OUTPATIENT
Start: 2022-07-07

## 2022-07-07 NOTE — TELEPHONE ENCOUNTER
If she is having severe headache where she is completely debilitated she is going to need to go to the ER or urgent care. I have no treatment for that type of severe headache. She needs to give the medication time. If she feels worse on the increase Topamax please let me know and I will eliminate it completely.

## 2022-07-07 NOTE — TELEPHONE ENCOUNTER
Okay, please verify she is taking topiramate 50 mg twice a day and if so lets increase to 75 mg twice a day. I will send in a new supply of medication 25 mg tablets so she will need to take 3 in the morning and 3 at night. Please ask if her headaches were doing okay before this current increase in pain.

## 2022-07-11 ENCOUNTER — DOCUMENTATION ONLY (OUTPATIENT)
Dept: NEUROSURGERY | Age: 27
End: 2022-07-11

## 2022-07-11 NOTE — PROGRESS NOTES
I received office notes from Dr. Magdy Hayes at [facility] dated July 6, 2022. In summary, the patient had improving presumed papilledema. A recent lumbar puncture with opening pressure 21 cm of CSF. The patient took 3 days of Topamax holiday prior to LP. Her vision is not threatened. Her papilledema is proving on 50 mg Topamax twice daily however she reports worsening headache and pulsatile tinnitus. Weight loss and sleep apnea testing was recommended by Dr. Forest Eaton.

## 2022-07-28 ENCOUNTER — OFFICE VISIT (OUTPATIENT)
Dept: NEUROLOGY | Age: 27
End: 2022-07-28
Payer: MEDICAID

## 2022-07-28 VITALS
WEIGHT: 195 LBS | OXYGEN SATURATION: 97 % | RESPIRATION RATE: 16 BRPM | HEIGHT: 61 IN | DIASTOLIC BLOOD PRESSURE: 78 MMHG | BODY MASS INDEX: 36.82 KG/M2 | SYSTOLIC BLOOD PRESSURE: 110 MMHG | HEART RATE: 91 BPM

## 2022-07-28 DIAGNOSIS — G43.709 CHRONIC MIGRAINE W/O AURA, NOT INTRACTABLE, W/O STAT MIGR: ICD-10-CM

## 2022-07-28 DIAGNOSIS — I67.9 INTRACRANIAL VASCULAR STENOSIS: ICD-10-CM

## 2022-07-28 DIAGNOSIS — G93.2 IIH (IDIOPATHIC INTRACRANIAL HYPERTENSION): Primary | ICD-10-CM

## 2022-07-28 DIAGNOSIS — T88.7XXA SIDE EFFECT OF MEDICATION: ICD-10-CM

## 2022-07-28 PROCEDURE — 99214 OFFICE O/P EST MOD 30 MIN: CPT | Performed by: PSYCHIATRY & NEUROLOGY

## 2022-07-28 RX ORDER — ULIPRISTAL ACETATE 30 MG/1
TABLET ORAL
COMMUNITY
Start: 2022-06-24

## 2022-07-28 RX ORDER — METHYLPREDNISOLONE 4 MG/1
TABLET ORAL
COMMUNITY
Start: 2022-05-10 | End: 2022-08-05 | Stop reason: ALTCHOICE

## 2022-07-28 NOTE — PROGRESS NOTES
Chief Complaint   Patient presents with    Follow-up     Headache: Reports medication is working for her headaches.       Visit Vitals  /78 (BP 1 Location: Right upper arm, BP Patient Position: Sitting)   Pulse 91   Resp 16   Ht 5' 1\" (1.549 m)   Wt 195 lb (88.5 kg)   SpO2 97%   BMI 36.84 kg/m²

## 2022-07-28 NOTE — PROGRESS NOTES
Chief Complaint   Patient presents with    Follow-up     Headache: Reports medication is working for her headaches. HPI    81-year-old woman following up. She has increased intracranial hypertension confirmed by lumbar puncture, opening pressure 21. I initiated her on low-dose topiramate which now I have titrated to 75 mg twice a day with good control currently. Headaches are much better. She still gets occasional pulsatile tinnitus left more than right. Mild disease on MRV. She saw ophthalmology less than a month ago with a normal evaluation and no visual field defect. She is having some mild side effects from topiramate manifesting as paresthesias. Having a little bit of soreness where the lumbar puncture was done. Difficulty losing weight. History of anorexia. She has been very careful about her methods of weight loss. BKGD:  [facility] is a 16-year-old woman referred to me from interventional for headaches. I spoke with her personally. I also reviewed the neuroimaging. It sounds like she has had headaches ever since she was a child. Mother has migraines. She has many types of headaches. Her most common type of headache usually is posterior gradually becoming diffuse more on the left sometimes throbbing with nausea light sensitivity. As of August 2021 she began to have pulsatile tinnitus of both sides more on the left. She has gone to the ER for headaches. Normal head CT. She was referred to neuro interventional who has identified intracranial vascular stenosis. She sees ophthalmology next week for formal evaluation. Additionally, she has had a hard time losing weight. She struggles with anxiety and PTSD on various psychotropics. No loss of vision no double vision. She is already enrolled in the weight loss clinic. Review of Systems   Eyes:  Negative for blurred vision and double vision. Neurological:  Positive for headaches.    All other systems reviewed and are negative. Past Medical History:   Diagnosis Date    Anxiety     Borderline personality disorder ([facility] 2022    Chronic pain     Depression     Endometriosis     Fibromyalgia 2017    Headache     Lupus ([facility] 2017    Migraines     Patella-femoral syndrome     Personality disorder ([facility] 2022     Family History   Problem Relation Age of Onset    Hypertension Mother     Arthritis-rheumatoid Mother     Hypertension Father     Arthritis-rheumatoid Brother      Social History     Socioeconomic History    Marital status: SINGLE     Spouse name: Not on file    Number of children: Not on file    Years of education: Not on file    Highest education level: Not on file   Occupational History    Not on file   Tobacco Use    Smoking status: Never    Smokeless tobacco: Never   Vaping Use    Vaping Use: Never used   Substance and Sexual Activity    Alcohol use: Yes     Comment: social    Drug use: Yes     Frequency: 1.0 times per week     Types: Marijuana     Comment: 1x/wk for anxiety and occas pain    Sexual activity: Not Currently   Other Topics Concern    Not on file   Social History Narrative    Not on file     Social Determinants of Health     Financial Resource Strain: Not on file   Food Insecurity: Not on file   Transportation Needs: Not on file   Physical Activity: Not on file   Stress: Not on file   Social Connections: Not on file   Intimate Partner Violence: Not on file   Housing Stability: Not on file     Allergies   Allergen Reactions    Pcn [Penicillins] Hives    Kiwi Itching    Plaquenil [Hydroxychloroquine] Rash     Rash and oral ulcers         Current Outpatient Medications   Medication Sig    topiramate (TOPAMAX) 25 mg tablet Take 3 Tablets by mouth two (2) times a day. traZODone (DESYREL) 50 mg tablet Take 50 mg by mouth as needed. gabapentin (NEURONTIN) 300 mg capsule     hydrOXYzine pamoate (VISTARIL) 50 mg capsule     ibuprofen (MOTRIN) 600 mg tablet 600 mg.     Blisovi Fe 1/20, 28, 1 mg-20 mcg (21)/75 mg (7) tab     FLUoxetine (PROzac) 40 mg capsule Take 1 Capsule by mouth daily. Indications: major depressive disorder    methylPREDNISolone (MEDROL DOSEPACK) 4 mg tablet  (Patient not taking: Reported on 7/28/2022)    Benji Payment tab tablet  (Patient not taking: Reported on 7/28/2022)     No current facility-administered medications for this visit. Neurologic Exam     Mental Status   WD/WN adult in NAD, normal grooming  VSS  A&O x 3    PERRL, nonicteric  Face is covered  Speech is fluent and clear  No limb ataxia. No abnl movements. Moving all extemities spontaneously and symmetric  Normal gait         Visit Vitals  /78 (BP 1 Location: Right upper arm, BP Patient Position: Sitting)   Pulse 91   Resp 16   Ht 5' 1\" (1.549 m)   Wt 195 lb (88.5 kg)   SpO2 97%   BMI 36.84 kg/m²       Assessment and Plan   Diagnoses and all orders for this visit:    1. IIH (idiopathic intracranial hypertension)    2. Intracranial vascular stenosis    3. Chronic migraine w/o aura, not intractable, w/o stat migr    4. Side effect of medication    20-year-old woman with increased intracranial hypertension confirmed by lumbar puncture also has likely contribution from intracranial vascular stenosis. She also has chronic migraine. I think all 3 of these conditions right now are stable on daily medication. She is having some mild side effects but nothing intolerable. I examined her low back with a lumbar puncture was done and the skin is clean and dry and intact. No evidence of abscess. For now, continue the current regimen. I would like to see her in February after she sees ophthalmology. ER precautions discussed. During today's visit I reviewed the neuroimaging, external records from ophthalmology. 30 minutes of time was taken in total reviewing the medical record to include the neuroimaging, external records, face-to-face time, and time completing medical documentation today.     I reviewed and decided to continue the current medications. This clinical note was dictated with an electronic dictation software that can make unintentional errors. If there are any questions, please contact me directly for clarification.       2 [facility], [facility] [address]  Diplomate CHRISTIANN

## 2022-08-05 ENCOUNTER — OFFICE VISIT (OUTPATIENT)
Dept: PRIMARY CARE CLINIC | Age: 27
End: 2022-08-05
Payer: MEDICAID

## 2022-08-05 ENCOUNTER — HOSPITAL ENCOUNTER (OUTPATIENT)
Dept: GENERAL RADIOLOGY | Age: 27
Discharge: HOME | End: 2022-08-05
Attending: FAMILY MEDICINE
Payer: MEDICAID

## 2022-08-05 VITALS
RESPIRATION RATE: 16 BRPM | HEART RATE: 97 BPM | TEMPERATURE: 98.9 F | SYSTOLIC BLOOD PRESSURE: 112 MMHG | BODY MASS INDEX: 36.28 KG/M2 | OXYGEN SATURATION: 97 % | DIASTOLIC BLOOD PRESSURE: 76 MMHG | WEIGHT: 192 LBS

## 2022-08-05 DIAGNOSIS — M54.6 CHRONIC RIGHT-SIDED THORACIC BACK PAIN: ICD-10-CM

## 2022-08-05 DIAGNOSIS — G89.29 CHRONIC RIGHT-SIDED THORACIC BACK PAIN: Primary | ICD-10-CM

## 2022-08-05 DIAGNOSIS — G89.29 CHRONIC RIGHT-SIDED THORACIC BACK PAIN: ICD-10-CM

## 2022-08-05 DIAGNOSIS — M54.6 CHRONIC RIGHT-SIDED THORACIC BACK PAIN: Primary | ICD-10-CM

## 2022-08-05 PROCEDURE — 72072 X-RAY EXAM THORAC SPINE 3VWS: CPT

## 2022-08-05 PROCEDURE — 99213 OFFICE O/P EST LOW 20 MIN: CPT | Performed by: FAMILY MEDICINE

## 2022-08-05 RX ORDER — ERGOCALCIFEROL 1.25 MG/1
50000 CAPSULE ORAL
COMMUNITY

## 2022-08-05 RX ORDER — CHOLECALCIFEROL (VITAMIN D3) 125 MCG
CAPSULE ORAL
COMMUNITY

## 2022-08-05 NOTE — PROGRESS NOTES
HPI     Chief Complaint   Patient presents with    Thoracic Back Pain     Onset January 2022 Sharp pain. Hurts when breathing, activity, at night     She is a 23 y.o. female who presents for back pain. States she has pain in upper R thoracic back region. States it hurts with deep breathing, exercise or any activity on the R side. Ongoing since January after she did most of the heavy lifting moving. States she went to [facility] in May/ June who gave her a steroid and told her to follow up with PT. States she was not able to work with PT because of her insurance. They did not do an Xray. States she is anting an Xray to make sure nothing is wrong before she does therapy. States it hurts in R posterior back under shoulder and wraps slightly to the side. States it will ease for a little but any motion with her R arm and it will hurt. No numbness or tingling. Review of Systems   Respiratory:  Negative for shortness of breath. Cardiovascular:  Negative for chest pain. Musculoskeletal:  Positive for back pain. Neurological:  Negative for numbness. Reviewed PmHx, RxHx, FmHx, SocHx, AllgHx and updated and dated in the chart. Physical Exam:  Visit Vitals  /76 (BP 1 Location: Right arm, BP Patient Position: Sitting, BP Cuff Size: Adult long)   Pulse 97   Temp 98.9 °F (37.2 °C) (Oral)   Resp 16   Wt 192 lb (87.1 kg)   SpO2 97%   BMI 36.28 kg/m²     Physical Exam  Vitals and nursing note reviewed. Constitutional:       General: She is not in acute distress. Appearance: Normal appearance. She is not ill-appearing. Cardiovascular:      Rate and Rhythm: Normal rate and regular rhythm. Heart sounds: No murmur heard. Pulmonary:      Effort: Pulmonary effort is normal. No respiratory distress. Breath sounds: Normal breath sounds. Musculoskeletal:      Comments: Tenderness to palpation under scapula and along R lateral ribs.  Pain is reproduced with rotation of trunk to L and side bending to R. Moves all ext normally without difficulty. Gait is normal. No gross deformity. Neurological:      General: No focal deficit present. Mental Status: She is alert. Psychiatric:         Mood and Affect: Mood normal.         Behavior: Behavior normal.     No results found for this or any previous visit (from the past 12 hour(s)). Assessment / Plan     Diagnoses and all orders for this visit:    1. Chronic right-sided thoracic back pain  -     XR SPINE THORAC 3 V; Future  -     REFERRAL TO PHYSICAL THERAPY     Can use OTC NSAIDs, salonpas or lidocaine patches. Given home exercises/ stretches. I have discussed the diagnosis with the patient and the intended plan as seen in the above orders. The patient has received an after-visit summary and questions were answered concerning future plans. I have discussed medication side effects and warnings with the patient as well.     Gogo Abel, DO

## 2022-08-05 NOTE — PROGRESS NOTES
Identified pt with two pt identifiers(name and ). Chief Complaint   Patient presents with    Thoracic Back Pain     Onset 2022 Sharp pain. Hurts when breathing, activity, at night      Vitals:    22 1459   BP: 112/76   Pulse: (!) 102   Resp: 16   Temp: 98.9 °F (37.2 °C)   TempSrc: Oral   SpO2: 97%   Weight: 192 lb (87.1 kg)   PainSc:   7   PainLoc: Back      Health Maintenance Due   Topic    HPV Age 9Y-34Y (1 - 2-dose series)    COVID-19 Vaccine (3 - Booster for Pfizer series)       Depression Screening:  :     3 most recent PHQ Screens 2022   Little interest or pleasure in doing things Not at all   Feeling down, depressed, irritable, or hopeless Not at all   Total Score PHQ 2 0        Fall Risk Assessment:  :     No flowsheet data found. Abuse Screening:  :     Abuse Screening Questionnaire 2021   Do you ever feel afraid of your partner? N   Are you in a relationship with someone who physically or mentally threatens you? N   Is it safe for you to go home? Y        Coordination of Care Questionnaire:  :     1. Have you been to the ER, urgent care clinic since your last visit? Hospitalized since your last visit? Yes 2022 at [facility]     2. Have you seen or consulted any other health care providers outside of the [address] [place] [address] since your last visit? Include any pap smears or colon screening.    Yes Dr. Chidi Rucker

## 2022-08-10 ENCOUNTER — OFFICE VISIT (OUTPATIENT)
Dept: PRIMARY CARE CLINIC | Age: 27
End: 2022-08-10
Payer: MEDICAID

## 2022-08-10 VITALS
TEMPERATURE: 97.5 F | RESPIRATION RATE: 16 BRPM | DIASTOLIC BLOOD PRESSURE: 71 MMHG | SYSTOLIC BLOOD PRESSURE: 113 MMHG | OXYGEN SATURATION: 98 % | HEIGHT: 61 IN | WEIGHT: 192.8 LBS | BODY MASS INDEX: 36.4 KG/M2 | HEART RATE: 99 BPM

## 2022-08-10 DIAGNOSIS — R35.0 URINARY FREQUENCY: ICD-10-CM

## 2022-08-10 DIAGNOSIS — R82.90 ABNORMAL URINALYSIS: ICD-10-CM

## 2022-08-10 DIAGNOSIS — N30.01 ACUTE CYSTITIS WITH HEMATURIA: Primary | ICD-10-CM

## 2022-08-10 LAB
BILIRUB UR QL STRIP: NEGATIVE
GLUCOSE UR-MCNC: NEGATIVE MG/DL
KETONES P FAST UR STRIP-MCNC: NEGATIVE MG/DL
PH UR STRIP: 5.5 [PH] (ref 4.6–8)
PROT UR QL STRIP: NEGATIVE
SP GR UR STRIP: 1.03 (ref 1–1.03)
UA UROBILINOGEN AMB POC: NORMAL (ref 0.2–1)
URINALYSIS CLARITY POC: NORMAL
URINALYSIS COLOR POC: YELLOW
URINE BLOOD POC: NORMAL
URINE LEUKOCYTES POC: NORMAL
URINE NITRITES POC: NEGATIVE

## 2022-08-10 PROCEDURE — 99213 OFFICE O/P EST LOW 20 MIN: CPT | Performed by: FAMILY MEDICINE

## 2022-08-10 PROCEDURE — 81003 URINALYSIS AUTO W/O SCOPE: CPT | Performed by: FAMILY MEDICINE

## 2022-08-10 RX ORDER — NITROFURANTOIN 25; 75 MG/1; MG/1
100 CAPSULE ORAL 2 TIMES DAILY
Qty: 10 CAPSULE | Refills: 0 | Status: SHIPPED | OUTPATIENT
Start: 2022-08-10 | End: 2022-08-15

## 2022-08-10 NOTE — PROGRESS NOTES
Paulo Devlin is a 23 y.o. female who presents for possible UTI. Dysuria: yes    Urinary urgency: yes    Urinary frequency: yes    Hematuria: no    Symptoms have been present for unsure    History of UTI's? yes    History of kidney stones?: no     Allergies- reviewed: Allergies   Allergen Reactions    Pcn [Penicillins] Hives    Kiwi Itching    Plaquenil [Hydroxychloroquine] Rash     Rash and oral ulcers     Medications- reviewed:   Current Outpatient Medications   Medication Sig    nitrofurantoin, macrocrystal-monohydrate, (Macrobid) 100 mg capsule Take 1 Capsule by mouth two (2) times a day for 5 days. cholecalciferol, vitamin D3, 50 mcg (2,000 unit) tab Take  by mouth. topiramate (TOPAMAX) 25 mg tablet Take 3 Tablets by mouth two (2) times a day. traZODone (DESYREL) 50 mg tablet Take 50 mg by mouth as needed. gabapentin (NEURONTIN) 300 mg capsule     hydrOXYzine pamoate (VISTARIL) 50 mg capsule     ibuprofen (MOTRIN) 600 mg tablet 600 mg. Blisovi Fe 1/20, 28, 1 mg-20 mcg (21)/75 mg (7) tab     FLUoxetine (PROzac) 40 mg capsule Take 1 Capsule by mouth daily. Indications: major depressive disorder    ergocalciferol (ERGOCALCIFEROL) 1,250 mcg (50,000 unit) capsule Take 50,000 Units by mouth. (Patient not taking: No sig reported)    Ozell Devonshire tab tablet  (Patient not taking: No sig reported)     No current facility-administered medications for this visit.          Past Medical History- reviewed:  Past Medical History:   Diagnosis Date    Anxiety     Borderline personality disorder ([facility] 2022    Chronic pain     Depression     Endometriosis     Fibromyalgia 2017    Headache     Lupus ([facility] 2017    Migraines     Patella-femoral syndrome     Personality disorder ([facility] 2022         Past Surgical History- reviewed:   Past Surgical History:   Procedure Laterality Date    HX HEENT  2001    swallowed foreign body nickel     Social History- reviewed:  Social History     Socioeconomic History    Marital status: SINGLE     Spouse name: Not on file    Number of children: Not on file    Years of education: Not on file    Highest education level: Not on file   Occupational History    Not on file   Tobacco Use    Smoking status: Never    Smokeless tobacco: Never   Vaping Use    Vaping Use: Never used   Substance and Sexual Activity    Alcohol use: Yes     Comment: social    Drug use: Yes     Frequency: 1.0 times per week     Types: Marijuana     Comment: 1x/wk for anxiety and occas pain    Sexual activity: Not Currently   Other Topics Concern    Not on file   Social History Narrative    Not on file     Social Determinants of Health     Financial Resource Strain: Not on file   Food Insecurity: Not on file   Transportation Needs: Not on file   Physical Activity: Not on file   Stress: Not on file   Social Connections: Not on file   Intimate Partner Violence: Not on file   Housing Stability: Not on file     Immunizations- reviewed:   Immunization History   Administered Date(s) Administered    COVID-19, PFIZER PURPLE top, DILUTE for use, (age 15 y+), IM, 30mcg/0.3mL 04/01/2021, 04/22/2021    Tdap 04/19/2022       ROS:  General: No fevers or chills  GI: No abdominal pain, flank pain, nausea, or vomiting    Physical Exam:  Visit Vitals  /71 (BP 1 Location: Right arm, BP Patient Position: Sitting, BP Cuff Size: Small adult)   Pulse 99   Temp 97.5 °F (36.4 °C) (Temporal)   Resp 16   Ht 5' 1\" (1.549 m)   Wt 192 lb 12.8 oz (87.5 kg)   SpO2 98%   BMI 36.43 kg/m²       Gen: NAD. Responds to all questions appropriately. Eye: Conjunctiva are clear. Lungs: No labored respirations. CTAB. CV: RRR. No m/r/g. Back: No CVA tenderness. Abdomen: Soft. Nontender. No rebound or guarding. Extremities: Moving all extremities equally.      Labs:  UA trace LE and trace blood  Recent Results (from the past 12 hour(s))   AMB POC URINALYSIS DIP STICK AUTO W/O MICRO    Collection Time: 08/10/22  2:09 PM   Result Value Ref Range    Color (UA POC) Yellow     Clarity (UA POC) Cloudy     Glucose (UA POC) Negative Negative    Bilirubin (UA POC) Negative Negative    Ketones (UA POC) Negative Negative    Specific gravity (UA POC) 1.030 1.001 - 1.035    Blood (UA POC) Trace Negative    pH (UA POC) 5.5 4.6 - 8.0    Protein (UA POC) Negative Negative    Urobilinogen (UA POC) 0.2 mg/dL 0.2 - 1    Nitrites (UA POC) Negative Negative    Leukocyte esterase (UA POC) Trace Negative         Assessment:    ICD-10-CM ICD-9-CM    1. Acute cystitis with hematuria  N30.01 595.0       2. Abnormal urinalysis  R82.90 791.9 CULTURE, URINE      CULTURE, URINE      3. Urinary frequency  R35.0 788.41 AMB POC URINALYSIS DIP STICK AUTO W/O MICRO        If symptoms worsen or fail to improve RTC. Given handout on UTI's and ER precautions. I have discussed the diagnosis with the patient and the intended plan as seen in the above orders. Patient verbalized understanding of the plan and agrees with the plan. The patient has received an after-visit summary and questions were answered concerning future plans. I have discussed medication side effects and warnings with the patient as well. Informed patient to return to the office if new symptoms arise.     Corrine Rodriguez, DO

## 2022-08-10 NOTE — PROGRESS NOTES
Identified pt with two pt identifiers(name and ). Chief Complaint   Patient presents with    Urinary Frequency        3 most recent PHQ Screens 2022   Little interest or pleasure in doing things Not at all   Feeling down, depressed, irritable, or hopeless Not at all   Total Score PHQ 2 0        Vitals:    08/10/22 1346   BP: 113/71   Pulse: (!) 110   Resp: 16   Temp: 97.5 °F (36.4 °C)   TempSrc: Temporal   SpO2: 98%   Weight: 192 lb 12.8 oz (87.5 kg)   Height: 5' 1\" (1.549 m)       Health Maintenance Due   Topic Date Due    HPV Age 9Y-34Y (1 - 2-dose series) Never done    COVID-19 Vaccine (3 - Booster for Pfizer series) 2021        1. Have you been to the ER, urgent care clinic since your last visit? Hospitalized since your last visit? No    2. Have you seen or consulted any other health care providers outside of the [address] [place] [address] since your last visit? Include any pap smears or colon screening. No    3. For patients aged 39-70: Has the patient had a colonoscopy / FIT/ Cologuard? NA - based on age      If the patient is female:    4. For patients aged 41-77: Has the patient had a mammogram within the past 2 years? NA - based on age or sex      11. For patients aged 21-65: Has the patient had a pap smear?  NA - based on age or sex

## 2022-08-12 LAB
BACTERIA SPEC CULT: NORMAL
CC UR VC: NORMAL
SERVICE CMNT-IMP: NORMAL

## 2022-08-22 ENCOUNTER — HOSPITAL ENCOUNTER (OUTPATIENT)
Dept: PHYSICAL THERAPY | Age: 27
Discharge: HOME | End: 2022-08-22
Payer: MEDICAID

## 2022-08-22 PROCEDURE — 97161 PT EVAL LOW COMPLEX 20 MIN: CPT | Performed by: PHYSICAL THERAPIST

## 2022-08-22 NOTE — PROGRESS NOTES
[facility]  [facility], [address] [place] [address]  Phone: [phone]  Fax: [phone]    Plan of Care/Statement of Necessity for Physical Therapy Services  2-15    Patient name: Kiara Pineda  : 1995  Provider#: [id number]  Referral source: Shelly Casillas DO      Medical/Treatment Diagnosis: Back pain [M54.9]     Prior Hospitalization: see medical history     Comorbidities: see evaluation  Prior Level of Function:see evaluation  Medications: Verified on Patient Summary List  Start of Care: 2022     Onset Date:see evaluation     The Plan of Care and following information is based on the information from the initial evaluation.     Assessment/ key information: Patient presents thoracic strain and will benefit from physical therapy to address deficits noted below in problem list.   Evaluation Complexity History LOW Complexity : Zero comorbidities / personal factors that will impact the outcome / POC; Examination LOW Complexity : 1-2 Standardized tests and measures addressing body structure, function, activity limitation and / or participation in recreation  ;Presentation LOW Complexity : Stable, uncomplicated  ;Clinical Decision Making Other outcome measures clinical judgment  LOW   Overall Complexity Rating: LOW   Problem List: pain affecting function, decrease ROM, decrease strength, decrease ADL/ functional abilitiies, decrease activity tolerance, and decrease flexibility/ joint mobility   Treatment Plan may include any combination of the following: Therapeutic exercise, Therapeutic activities, Neuromuscular re-education, Physical agent/modality, Manual therapy, Patient education and Self Care training,dry needling PRN  Patient / Family readiness to learn indicated by: asking questions, trying to perform skills and interest  Persons(s) to be included in education: patient (P)  Barriers to Learning/Limitations: None  Patient Goal (s): please see evaluation in [facility]  Patient Self Reported Health Status: please see paper chart  Rehabilitation Potential: good    Short Term Goals: To be accomplished in 5 treatments:  -Independent in HEP as evidenced on ability to perform at least 5 exercises from HEP using proper form without verbal cuing.   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 Patient/ Caregiver education and instruction: self care, activity modification and exercises    [x]  Plan of care has been reviewed with PTA    Certification Period: 8/22/2022 -  11/22/22    Otto Strauss. Bossman PT, DPT, CMTPT      8/67/3787 6:94 PM  PT License Number: [id number]  _____________________________________________________________________    I certify that the above Therapy Services are being furnished while the patient is under my care. I agree with the treatment plan and certify that this therapy is necessary.     [address] [place] [address] Signature:____________________  Date:____________Time:_________

## 2022-08-22 NOTE — PROGRESS NOTES
PT INITIAL EVALUATION NOTE - [facility] 2-15    Patient Name: Felisha Jenkins  Date:2022  : 1995  [x]  Patient  Verified  Payor: Scott oLpez / Plan: [facility] CCCP / Product Type: Managed Care Medicaid /    In time:110 P  Out time:200 P  Total Treatment Time (min): 50 (50 eval, 0 timed, 0 modality see below)  Total Timed Codes (min): 0   1:1 Treatment Time ([facility]/[place]): 0    Visit #:1    Treatment Area: Back pain [M54.9]    SUBJECTIVE  Any medication changes, allergies to medications, adverse drug reactions, diagnosis change, or new procedure performed?: [] No    [x] Yes (see summary sheet for update)  Date of onset/injury: 2022  MYNOR: Pt was moving. Lifting something heavy  Since then, has been getting worse over time. Pain:   9/10 max 1/10 min 7/10 now     Location of symptoms: thoracic area on the right, recently in the last week having chest pains on the left side. (Associated with the back hurting, chest pain occurs with inhale, back pain occurs with exhale)  Pain under right armpit. Denies numbness/tingling in either arm  Admits to new neck pain  Description of symptoms: sharp, stabbing, cramping  Aggravated by: lifting, walking, using arms (folding clothes, reaching overhead.)  Eased by: laying on back, heat, sitting  Prior tests/injections:xray of thoracic spine-WNL  PMH:  anxiety/OCD, borderline personality disorder, PTSD, hypermobility syndrome, possible lupus (pending dx), had suicide attempt earlier this year and was hospitalized. Recent weight/loss or weight gain: going back and forth  Prior tx: for knee. Has had issue before, but it was in the lower back, has a hx of ribs hurting. 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 Social: living with Mom and brother  Sedentary most days, enjoys gardening, outdoor chores.     Exercise: pt would prefer home exercise vs. gym  Prior level of function/activity level: Able to reach overhead without pain, sit without back pain  Patient goal: \"for this to stop hurting so pt can do what she wants to do. I haven't been able to do a full deep clean like she wants to. \"    OBJECTIVE    Observation: pt in chair slumped toward the left side. Kyphosis  [x] Inc    [] Dec    AROM  WNL thoracic spine all planes, pain with lindsey SB and  lindsey rotation    AROM lindsey shoulder WNL, pain with flexion and abd bilaterally    MMT lindsey shoulders  Flexion and Abd: 3+  IR and ER: 4-  Rhomboids: 4-    Tenderness to palpation:  lindsey rhomboid major/minor, lats, subscap RIGHT>LEFT    Flexibility: WNL    Joint mobility:  hypomobility t1-t9, pain noted t5-t9    OBJECTIVE  [x] Skin assessment post-treatment:  [x]intact []redness- no adverse reaction    []redness - adverse reaction:           With   [x] eval   [] manual   [] self care    Patient Education: [x] Review HEP    [] Progressed/Changed HEP based on:   [] positioning   [] body mechanics   [] transfers   [x] other:  re: mechanism of injury/condition, role of physical therapy, prognosis for recovery, heat vs ice, Education re: Advised pt to get fitted at [facility] for properly supportive bra in order to decrease tension at cervical spine    Education re: proper posture in relation to thoracic pain. Demonstrated using visual and tactile cues. Pain Level (0-10 scale) post treatment: 7    ASSESSMENT/Changes in Function:     [x]  See Plan of [place].  Bossman PT, DPT, CMTPT  PT License Number: [id number]   8/22/2022  1:11 PM

## 2022-08-29 ENCOUNTER — APPOINTMENT (OUTPATIENT)
Dept: PHYSICAL THERAPY | Age: 27
End: 2022-08-29
Payer: MEDICAID

## 2022-08-31 ENCOUNTER — APPOINTMENT (OUTPATIENT)
Dept: PHYSICAL THERAPY | Age: 27
End: 2022-08-31
Payer: MEDICAID

## 2022-09-06 ENCOUNTER — APPOINTMENT (OUTPATIENT)
Dept: PHYSICAL THERAPY | Age: 27
End: 2022-09-06

## 2022-09-12 ENCOUNTER — APPOINTMENT (OUTPATIENT)
Dept: PHYSICAL THERAPY | Age: 27
End: 2022-09-12

## 2022-09-14 ENCOUNTER — APPOINTMENT (OUTPATIENT)
Dept: PHYSICAL THERAPY | Age: 27
End: 2022-09-14

## 2022-09-19 ENCOUNTER — APPOINTMENT (OUTPATIENT)
Dept: PHYSICAL THERAPY | Age: 27
End: 2022-09-19

## 2022-09-21 ENCOUNTER — APPOINTMENT (OUTPATIENT)
Dept: PHYSICAL THERAPY | Age: 27
End: 2022-09-21

## 2023-01-03 RX ORDER — TOPIRAMATE 25 MG/1
TABLET ORAL
Qty: 180 TABLET | Refills: 5 | Status: SHIPPED | OUTPATIENT
Start: 2023-01-03

## 2023-02-22 ENCOUNTER — OFFICE VISIT (OUTPATIENT)
Dept: PRIMARY CARE CLINIC | Age: 28
End: 2023-02-22
Payer: MEDICAID

## 2023-02-22 VITALS
RESPIRATION RATE: 18 BRPM | WEIGHT: 186 LBS | DIASTOLIC BLOOD PRESSURE: 79 MMHG | HEART RATE: 92 BPM | OXYGEN SATURATION: 96 % | HEIGHT: 61 IN | TEMPERATURE: 97.5 F | SYSTOLIC BLOOD PRESSURE: 121 MMHG | BODY MASS INDEX: 35.12 KG/M2

## 2023-02-22 DIAGNOSIS — L60.0 INGROWN TOENAIL: ICD-10-CM

## 2023-02-22 DIAGNOSIS — G89.29 CHRONIC THORACIC BACK PAIN, UNSPECIFIED BACK PAIN LATERALITY: ICD-10-CM

## 2023-02-22 DIAGNOSIS — M54.6 CHRONIC THORACIC BACK PAIN, UNSPECIFIED BACK PAIN LATERALITY: ICD-10-CM

## 2023-02-22 DIAGNOSIS — R39.198 ABNORMAL URINATION: Primary | ICD-10-CM

## 2023-02-22 DIAGNOSIS — L60.8 NAIL DISCOLORATION: ICD-10-CM

## 2023-02-22 LAB
BILIRUB UR QL STRIP: NEGATIVE
GLUCOSE UR-MCNC: NEGATIVE MG/DL
KETONES P FAST UR STRIP-MCNC: NEGATIVE MG/DL
PH UR STRIP: 6.5 [PH] (ref 4.6–8)
PROT UR QL STRIP: NEGATIVE
SP GR UR STRIP: 1.03 (ref 1–1.03)
UA UROBILINOGEN AMB POC: NORMAL (ref 0.2–1)
URINALYSIS CLARITY POC: NORMAL
URINALYSIS COLOR POC: NORMAL
URINE BLOOD POC: NORMAL
URINE LEUKOCYTES POC: NORMAL
URINE NITRITES POC: NEGATIVE

## 2023-02-22 PROCEDURE — 81001 URINALYSIS AUTO W/SCOPE: CPT | Performed by: FAMILY MEDICINE

## 2023-02-22 PROCEDURE — 99213 OFFICE O/P EST LOW 20 MIN: CPT | Performed by: FAMILY MEDICINE

## 2023-02-22 NOTE — PROGRESS NOTES
HPI     Chief Complaint   Patient presents with    Urinary Frequency    Nail Problem     Brown spots on toenails. Discoloration. Both great toes hurt. She is a 33 y.o. female who presents for urinary frequency. Endorses urinary frequency a few weeks. States she has pain in her suprapubic region. No blood in urine. No fever, chills, nausea, vomiting. Has had a spot on her L great toenail for a while. States they typically grow out but this one didn't. States she did her toes yesterday and now she has pain along her great toenails. States her fingernails and toenails have also been brittle. States she struggles with depression. Feels it is stable. States she is still seeing Psychiatry and on Prozac. Denies SI/ HI. On the way out she mentioned she has still been having thoracic back pain. Denies numbness in legs or urinary incontinence. This has been an ongoing issues since before August 2022. She had a thoracic XRay in August that showed no acute fracture/ abnormality. She was referred to PT but states she went to a session but then was not able to follow up. She is interested in seeing Ortho. Review of Systems   Constitutional:  Negative for chills and fever. Gastrointestinal:  Negative for nausea and vomiting. Genitourinary:  Positive for frequency. Negative for dysuria. Reviewed PmHx, RxHx, FmHx, SocHx, AllgHx and updated and dated in the chart. Physical Exam:  Visit Vitals  /79 (BP 1 Location: Left upper arm, BP Patient Position: Sitting, BP Cuff Size: Adult)   Pulse 92   Temp 97.5 °F (36.4 °C) (Temporal)   Resp 18   Ht 5' 1\" (1.549 m)   Wt 186 lb (84.4 kg)   SpO2 96%   BMI 35.14 kg/m²     Physical Exam  Vitals and nursing note reviewed. Constitutional:       General: She is not in acute distress. Appearance: Normal appearance. She is not ill-appearing. Cardiovascular:      Rate and Rhythm: Normal rate and regular rhythm.       Heart sounds: No murmur heard.  Pulmonary:      Effort: Pulmonary effort is normal. No respiratory distress. Breath sounds: Normal breath sounds. No wheezing, rhonchi or rales. Abdominal:      General: There is no distension. Palpations: Abdomen is soft. Tenderness: There is abdominal tenderness. There is no guarding or rebound. Musculoskeletal:      Comments: Moves all ext equally. Able to get on and off exam table wo difficulty. No tenderness to palpation of thoracic spine. Skin:     Comments: She has ingrown toenails. Slight discoloration/ thickening of nail of lateral aspect of L great toe. Neurological:      General: No focal deficit present. Mental Status: She is alert. Psychiatric:         Mood and Affect: Mood normal.         Behavior: Behavior normal.     POC UA trace blood (spotting per patient), 1+ LE  Recent Results (from the past 12 hour(s))   AMB POC URINALYSIS DIP STICK AUTO W/ MICRO    Collection Time: 02/22/23  3:03 PM   Result Value Ref Range    Color (UA POC) Gemini     Clarity (UA POC) Cloudy     Glucose (UA POC) Negative Negative    Bilirubin (UA POC) Negative Negative    Ketones (UA POC) Negative Negative    Specific gravity (UA POC) 1.030 1.001 - 1.035    Blood (UA POC) Trace Negative    pH (UA POC) 6.5 4.6 - 8.0    Protein (UA POC) Negative Negative    Urobilinogen (UA POC) 1 mg/dL 0.2 - 1    Nitrites (UA POC) Negative Negative    Leukocyte esterase (UA POC) 1+ Negative        Assessment / Plan     Diagnoses and all orders for this visit:    1. Abnormal urination - Discussed results of UA. She would like to wait for culture before treating.   -     AMB POC URINALYSIS DIP STICK AUTO W/ MICRO  -     CULTURE, URINE; Future  -     CULTURE, URINE; Future    2. Nail discoloration - Will check labs. Discussed if labs normal and discoloration persists can given referral to Derm if interested. She will let us know if she would like referral. Referred to Podiatry for ingrown toenails.    - REFERRAL TO PODIATRY  -     CBC WITH AUTOMATED DIFF; Future  -     IRON PROFILE; Future  -     TSH 3RD GENERATION; Future  -     METABOLIC PANEL, COMPREHENSIVE; Future    3. Ingrown toenail  -     REFERRAL TO PODIATRY    4. Chronic thoracic back pain, unspecified back pain laterality  -     REFERRAL TO ORTHOPEDICS     I have discussed the diagnosis with the patient and the intended plan as seen in the above orders. The patient has received an after-visit summary and questions were answered concerning future plans. I have discussed medication side effects and warnings with the patient as well.     Vernon Nelson, DO

## 2023-02-22 NOTE — PROGRESS NOTES
Chief Complaint   Patient presents with    Urinary Frequency    Nail Problem     Brown spots on toenails. Discoloration. Both great toes hurt. Identified pt with two pt identifiers(name and ). Chief Complaint   Patient presents with    Urinary Frequency    Nail Problem     Brown spots on toenails. Discoloration. Both great toes hurt. 3 most recent PHQ Screens 2022   Little interest or pleasure in doing things Not at all   Feeling down, depressed, irritable, or hopeless Not at all   Total Score PHQ 2 0        There were no vitals filed for this visit. Health Maintenance Due   Topic Date Due    HPV Age 9Y-34Y (1 - 2-dose series) Never done    COVID-19 Vaccine (3 - Booster for Pfizer series) 2021    Flu Vaccine (1) Never done        1. Have you been to the ER, urgent care clinic since your last visit? Hospitalized since your last visit? No    2. Have you seen or consulted any other health care providers outside of the [address] [place] [address] since your last visit? Include any pap smears or colon screening. Dr. Lorin Casas    3. For patients aged 39-70: Has the patient had a colonoscopy / FIT/ Cologuard? NA - based on age      If the patient is female:    4. For patients aged 41-77: Has the patient had a mammogram within the past 2 years? NA - based on age or sex      11. For patients aged 21-65: Has the patient had a pap smear?  Yes - no Care Gap present

## 2023-02-23 LAB
ALBUMIN SERPL-MCNC: 3.4 G/DL (ref 3.5–5)
ALBUMIN/GLOB SERPL: 1 (ref 1.1–2.2)
ALP SERPL-CCNC: 85 U/L (ref 45–117)
ALT SERPL-CCNC: 12 U/L (ref 12–78)
ANION GAP SERPL CALC-SCNC: 4 MMOL/L (ref 5–15)
AST SERPL-CCNC: 9 U/L (ref 15–37)
BASOPHILS # BLD: 0.1 K/UL (ref 0–0.1)
BASOPHILS NFR BLD: 1 % (ref 0–1)
BILIRUB SERPL-MCNC: 0.1 MG/DL (ref 0.2–1)
BUN SERPL-MCNC: 8 MG/DL (ref 6–20)
BUN/CREAT SERPL: 10 (ref 12–20)
CALCIUM SERPL-MCNC: 9.2 MG/DL (ref 8.5–10.1)
CHLORIDE SERPL-SCNC: 115 MMOL/L (ref 97–108)
CO2 SERPL-SCNC: 24 MMOL/L (ref 21–32)
CREAT SERPL-MCNC: 0.8 MG/DL (ref 0.55–1.02)
DIFFERENTIAL METHOD BLD: ABNORMAL
EOSINOPHIL # BLD: 0.2 K/UL (ref 0–0.4)
EOSINOPHIL NFR BLD: 4 % (ref 0–7)
ERYTHROCYTE [DISTWIDTH] IN BLOOD BY AUTOMATED COUNT: 14.2 % (ref 11.5–14.5)
GLOBULIN SER CALC-MCNC: 3.4 G/DL (ref 2–4)
GLUCOSE SERPL-MCNC: 95 MG/DL (ref 65–100)
HCT VFR BLD AUTO: 35.9 % (ref 35–47)
HGB BLD-MCNC: 11.2 G/DL (ref 11.5–16)
IMM GRANULOCYTES # BLD AUTO: 0 K/UL (ref 0–0.04)
IMM GRANULOCYTES NFR BLD AUTO: 0 % (ref 0–0.5)
IRON SATN MFR SERPL: 15 % (ref 20–50)
IRON SERPL-MCNC: 63 UG/DL (ref 35–150)
LYMPHOCYTES # BLD: 1.9 K/UL (ref 0.8–3.5)
LYMPHOCYTES NFR BLD: 41 % (ref 12–49)
MCH RBC QN AUTO: 25.3 PG (ref 26–34)
MCHC RBC AUTO-ENTMCNC: 31.2 G/DL (ref 30–36.5)
MCV RBC AUTO: 81 FL (ref 80–99)
MONOCYTES # BLD: 0.3 K/UL (ref 0–1)
MONOCYTES NFR BLD: 6 % (ref 5–13)
NEUTS SEG # BLD: 2.2 K/UL (ref 1.8–8)
NEUTS SEG NFR BLD: 48 % (ref 32–75)
NRBC # BLD: 0 K/UL (ref 0–0.01)
NRBC BLD-RTO: 0 PER 100 WBC
PLATELET # BLD AUTO: 284 K/UL (ref 150–400)
PMV BLD AUTO: 11 FL (ref 8.9–12.9)
POTASSIUM SERPL-SCNC: 4.5 MMOL/L (ref 3.5–5.1)
PROT SERPL-MCNC: 6.8 G/DL (ref 6.4–8.2)
RBC # BLD AUTO: 4.43 M/UL (ref 3.8–5.2)
SODIUM SERPL-SCNC: 143 MMOL/L (ref 136–145)
TIBC SERPL-MCNC: 429 UG/DL (ref 250–450)
TSH SERPL DL<=0.05 MIU/L-ACNC: 0.94 UIU/ML (ref 0.36–3.74)
WBC # BLD AUTO: 4.6 K/UL (ref 3.6–11)

## 2023-02-24 ENCOUNTER — TELEPHONE (OUTPATIENT)
Dept: PRIMARY CARE CLINIC | Age: 28
End: 2023-02-24

## 2023-02-24 LAB
BACTERIA SPEC CULT: NORMAL
CC UR VC: NORMAL
SERVICE CMNT-IMP: NORMAL

## 2023-03-22 ENCOUNTER — OFFICE VISIT (OUTPATIENT)
Dept: PRIMARY CARE CLINIC | Age: 28
End: 2023-03-22
Payer: MEDICAID

## 2023-03-22 VITALS
DIASTOLIC BLOOD PRESSURE: 75 MMHG | OXYGEN SATURATION: 97 % | RESPIRATION RATE: 18 BRPM | TEMPERATURE: 97.3 F | BODY MASS INDEX: 35.3 KG/M2 | HEART RATE: 77 BPM | HEIGHT: 61 IN | WEIGHT: 187 LBS | SYSTOLIC BLOOD PRESSURE: 117 MMHG

## 2023-03-22 DIAGNOSIS — N39.41 URGE INCONTINENCE: Primary | ICD-10-CM

## 2023-03-22 DIAGNOSIS — J01.00 ACUTE MAXILLARY SINUSITIS, RECURRENCE NOT SPECIFIED: ICD-10-CM

## 2023-03-22 DIAGNOSIS — N39.41 URGE INCONTINENCE: ICD-10-CM

## 2023-03-22 DIAGNOSIS — D64.9 ANEMIA, UNSPECIFIED TYPE: ICD-10-CM

## 2023-03-22 PROCEDURE — 99214 OFFICE O/P EST MOD 30 MIN: CPT | Performed by: FAMILY MEDICINE

## 2023-03-22 RX ORDER — DOXYCYCLINE 100 MG/1
100 CAPSULE ORAL 2 TIMES DAILY
Qty: 14 CAPSULE | Refills: 0 | Status: SHIPPED | OUTPATIENT
Start: 2023-03-22 | End: 2023-03-29

## 2023-03-22 RX ORDER — GUANFACINE 1 MG/1
TABLET, EXTENDED RELEASE ORAL
COMMUNITY
Start: 2023-03-14

## 2023-03-22 NOTE — PROGRESS NOTES
Chief Complaint   Patient presents with    Follow-up    Labs     Hemoglobin         Identified pt with two pt identifiers(name and ). Chief Complaint   Patient presents with    Follow-up    Labs     Hemoglobin         3 most recent PHQ Screens 2023   Little interest or pleasure in doing things Not at all   Feeling down, depressed, irritable, or hopeless Nearly every day   Total Score PHQ 2 3   Trouble falling or staying asleep, or sleeping too much Not at all   Feeling tired or having little energy Nearly every day   Poor appetite, weight loss, or overeating Not at all   Feeling bad about yourself - or that you are a failure or have let yourself or your family down Nearly every day   Trouble concentrating on things such as school, work, reading, or watching TV Nearly every day   Moving or speaking so slowly that other people could have noticed; or the opposite being so fidgety that others notice Not at all   Thoughts of being better off dead, or hurting yourself in some way Not at all   PHQ 9 Score 12        There were no vitals filed for this visit. Health Maintenance Due   Topic Date Due    Varicella Vaccine (1 of 2 - 2-dose childhood series) Never done    HPV Age 9Y-34Y (1 - 2-dose series) Never done    COVID-19 Vaccine (3 - Booster for Pfizer series) 2021    Flu Vaccine (1) Never done        1. Have you been to the ER, urgent care clinic since your last visit? Hospitalized since your last visit? No    2. Have you seen or consulted any other health care providers outside of the [address] [place] [address] since your last visit? Include any pap smears or colon screening. No    3. For patients aged 39-70: Has the patient had a colonoscopy / FIT/ Cologuard? NA - based on age      If the patient is female:    4. For patients aged 41-77: Has the patient had a mammogram within the past 2 years? NA - based on age or sex      11. For patients aged 21-65: Has the patient had a pap smear?  Yes - no Care Gap present

## 2023-03-22 NOTE — PROGRESS NOTES
HPI     Chief Complaint   Patient presents with    Follow-up    Labs     Hemoglobin      She is a 33 y.o. female who presents for follow up. Started having a flare of jaw pain about a week ago. States it starts at TMJ and radiates down to teeth area. No fever, chills. States if she uses Flonase her nose will drain but otherwise feels congested. No sore throat, cough. No sick contact. Some ear pain but accompanies jaw pain. States she has pressure in maxillary sinsues and feels she is having sinus headache. No stroke symptoms. She is concerned she has developed a sinus infection. She does have TMJ at baseline and has seen specialist for this before. She has had her teeth pulled and cannot wear a . She cannot use muscle relaxer due to possible medication interactions she states. She is asking for Tramadol for her jaw pain today which she states is chronic to some degree. She is currently on Gabapentin. States she is having more urination issues. States when she wakes up from a nap or from sleeping overnight and she states she has the urge to go but can't get to the bathroom in time. No leakage with cough or sneezing. No fecal incontinence. No dysuria, hematuria. No neuro changes. She also presents to follow up on labs from labs month. Had mild anemia. Her iron sat was mildly low. She was on cycle at the time. She also asked about medical marijuana today. Review of Systems   Constitutional:  Negative for chills and fever. HENT:  Positive for sinus pressure and sinus pain. Respiratory:  Negative for cough. Reviewed PmHx, RxHx, FmHx, SocHx, AllgHx and updated and dated in the chart.     Physical Exam:  Visit Vitals  /75 (BP 1 Location: Right upper arm, BP Patient Position: Sitting, BP Cuff Size: Adult)   Pulse 77   Temp 97.3 °F (36.3 °C) (Temporal)   Resp 18   Ht 5' 1\" (1.549 m)   Wt 187 lb (84.8 kg)   SpO2 97%   BMI 35.33 kg/m²     Physical Exam  Vitals and nursing note reviewed. Constitutional:       General: She is not in acute distress. Appearance: Normal appearance. She is not ill-appearing. HENT:      Head:      Comments: Tenderness to palpation of maxillary sinuses BL. Right Ear: Tympanic membrane, ear canal and external ear normal.      Left Ear: Tympanic membrane, ear canal and external ear normal.      Nose: No rhinorrhea. Mouth/Throat:      Comments: Post nasal drip present. Cardiovascular:      Rate and Rhythm: Normal rate and regular rhythm. Heart sounds: No murmur heard. Pulmonary:      Effort: Pulmonary effort is normal. No respiratory distress. Breath sounds: Normal breath sounds. No wheezing, rhonchi or rales. Neurological:      General: No focal deficit present. Mental Status: She is alert. Psychiatric:         Mood and Affect: Mood normal.         Behavior: Behavior normal.     No results found for this or any previous visit (from the past 12 hour(s)). Assessment / Plan     Diagnoses and all orders for this visit:    1. Urge incontinence - Recehck UA, A1c. Given referral to [address] [place] [address] [facility] MI[facility]OS[facility]; Future  -     HEMOGLOBIN A1C WITH EAG; Future    2. Anemia, unspecified type - Check ferritin and recheck Hg. -     FERRITIN; Future  -     HEMOGLOBIN; Future    3. Acute maxillary sinusitis, recurrence not specified - Prescribed Doxy given her PCN allergy. Advised to continue NSAIDs and Tylenol as needed. She is already on Gabapentin. Discussed abx should help if this is a sinus infection. She has chronic jaw pain/ TMJ which she is followed by ENT for. Discussed if she is having chronic pain I could refer her to pain management but I do not prescribe Tramadol long term. She will let us know if she would like to pursue this. -     doxycycline (VIBRAMYCIN) 100 mg capsule; Take 1 Capsule by mouth two (2) times a day for 7 days.      Discussed we do not prescribe medical marijuana at our office. She plans to ask her psychiatrist for this because they prescribe it. I have discussed the diagnosis with the patient and the intended plan as seen in the above orders. The patient has received an after-visit summary and questions were answered concerning future plans. I have discussed medication side effects and warnings with the patient as well.     Arpita Marroquin, DO

## 2023-03-23 LAB
EST. AVERAGE GLUCOSE BLD GHB EST-MCNC: 114 MG/DL
FERRITIN SERPL-MCNC: 50 NG/ML (ref 8–252)
HBA1C MFR BLD: 5.6 % (ref 4–5.6)
HGB BLD-MCNC: 11.4 G/DL (ref 11.5–16)

## 2023-04-03 ENCOUNTER — TELEPHONE (OUTPATIENT)
Dept: PRIMARY CARE CLINIC | Age: 28
End: 2023-04-03

## 2023-04-03 DIAGNOSIS — E55.9 VITAMIN D DEFICIENCY: Primary | ICD-10-CM

## 2023-04-05 NOTE — TELEPHONE ENCOUNTER
Sent patient mychart per Dr. Gerda Hendrix Needs to call periodically to see if we have cancellations or may need to go to urgent care if she feels she cannot wait. I do not have any current openings this week.

## 2023-05-18 ENCOUNTER — TELEPHONE (OUTPATIENT)
Dept: PRIMARY CARE CLINIC | Facility: CLINIC | Age: 28
End: 2023-05-18

## 2023-05-25 RX ORDER — GUANFACINE 1 MG/1
TABLET, EXTENDED RELEASE ORAL
COMMUNITY
Start: 2023-03-14

## 2023-05-25 RX ORDER — FLUOXETINE HYDROCHLORIDE 40 MG/1
40 CAPSULE ORAL DAILY
COMMUNITY
Start: 2021-08-04

## 2023-05-25 RX ORDER — IBUPROFEN 600 MG/1
600 TABLET ORAL
COMMUNITY

## 2023-05-25 RX ORDER — TRAZODONE HYDROCHLORIDE 50 MG/1
50 TABLET ORAL PRN
COMMUNITY
Start: 2022-04-26

## 2023-05-25 RX ORDER — GABAPENTIN 300 MG/1
CAPSULE ORAL
COMMUNITY
Start: 2022-05-11

## 2023-05-25 RX ORDER — TOPIRAMATE 25 MG/1
TABLET ORAL
COMMUNITY
Start: 2023-01-03

## 2023-05-25 RX ORDER — HYDROXYZINE PAMOATE 50 MG/1
CAPSULE ORAL
COMMUNITY
Start: 2022-04-26

## 2023-07-21 ENCOUNTER — TELEPHONE (OUTPATIENT)
Dept: PRIMARY CARE CLINIC | Facility: CLINIC | Age: 28
End: 2023-07-21

## 2023-07-21 NOTE — TELEPHONE ENCOUNTER
[facility]  Staff  Subject: Message to Provider     QUESTIONS   Information for Provider? patient has an appointment set for 08/02/23 and   would like something for next week if possible for pre op exam   ---------------------------------------------------------------------------   --------------   [address]   [phone]; OK to leave message on voicemail   ---------------------------------------------------------------------------   --------------   SCRIPT ANSWERS   Relationship to Patient?  Self

## 2023-08-02 ENCOUNTER — OFFICE VISIT (OUTPATIENT)
Dept: PRIMARY CARE CLINIC | Facility: CLINIC | Age: 28
End: 2023-08-02
Payer: COMMERCIAL

## 2023-08-02 VITALS
HEIGHT: 61 IN | DIASTOLIC BLOOD PRESSURE: 80 MMHG | RESPIRATION RATE: 18 BRPM | SYSTOLIC BLOOD PRESSURE: 126 MMHG | TEMPERATURE: 97.4 F | WEIGHT: 186 LBS | HEART RATE: 82 BPM | OXYGEN SATURATION: 97 % | BODY MASS INDEX: 35.12 KG/M2

## 2023-08-02 DIAGNOSIS — K08.9 POOR DENTITION: Primary | ICD-10-CM

## 2023-08-02 DIAGNOSIS — Z01.818 PRE-OP EXAM: ICD-10-CM

## 2023-08-02 PROCEDURE — 99213 OFFICE O/P EST LOW 20 MIN: CPT | Performed by: FAMILY MEDICINE

## 2023-08-02 PROCEDURE — 93000 ELECTROCARDIOGRAM COMPLETE: CPT | Performed by: FAMILY MEDICINE

## 2023-08-02 RX ORDER — TOPIRAMATE 100 MG/1
100 TABLET, FILM COATED ORAL 2 TIMES DAILY
COMMUNITY

## 2023-08-02 SDOH — ECONOMIC STABILITY: FOOD INSECURITY: WITHIN THE PAST 12 MONTHS, YOU WORRIED THAT YOUR FOOD WOULD RUN OUT BEFORE YOU GOT MONEY TO BUY MORE.: PATIENT DECLINED

## 2023-08-02 SDOH — ECONOMIC STABILITY: HOUSING INSECURITY
IN THE LAST 12 MONTHS, WAS THERE A TIME WHEN YOU DID NOT HAVE A STEADY PLACE TO SLEEP OR SLEPT IN A SHELTER (INCLUDING NOW)?: PATIENT REFUSED

## 2023-08-02 SDOH — ECONOMIC STABILITY: FOOD INSECURITY: WITHIN THE PAST 12 MONTHS, THE FOOD YOU BOUGHT JUST DIDN'T LAST AND YOU DIDN'T HAVE MONEY TO GET MORE.: PATIENT DECLINED

## 2023-08-02 SDOH — ECONOMIC STABILITY: INCOME INSECURITY: HOW HARD IS IT FOR YOU TO PAY FOR THE VERY BASICS LIKE FOOD, HOUSING, MEDICAL CARE, AND HEATING?: PATIENT DECLINED

## 2023-08-03 DIAGNOSIS — D64.9 ANEMIA, UNSPECIFIED TYPE: Primary | ICD-10-CM

## 2023-08-03 LAB
ALBUMIN SERPL-MCNC: 3.4 G/DL (ref 3.5–5)
ALBUMIN/GLOB SERPL: 1 (ref 1.1–2.2)
ALP SERPL-CCNC: 66 U/L (ref 45–117)
ALT SERPL-CCNC: 23 U/L (ref 12–78)
ANION GAP SERPL CALC-SCNC: 4 MMOL/L (ref 5–15)
AST SERPL-CCNC: 14 U/L (ref 15–37)
BILIRUB SERPL-MCNC: 0.2 MG/DL (ref 0.2–1)
BUN SERPL-MCNC: 10 MG/DL (ref 6–20)
BUN/CREAT SERPL: 13 (ref 12–20)
CALCIUM SERPL-MCNC: 8.9 MG/DL (ref 8.5–10.1)
CHLORIDE SERPL-SCNC: 112 MMOL/L (ref 97–108)
CO2 SERPL-SCNC: 25 MMOL/L (ref 21–32)
CREAT SERPL-MCNC: 0.76 MG/DL (ref 0.55–1.02)
ERYTHROCYTE [DISTWIDTH] IN BLOOD BY AUTOMATED COUNT: 13.5 % (ref 11.5–14.5)
EST. AVERAGE GLUCOSE BLD GHB EST-MCNC: 111 MG/DL
GLOBULIN SER CALC-MCNC: 3.3 G/DL (ref 2–4)
GLUCOSE SERPL-MCNC: 96 MG/DL (ref 65–100)
HBA1C MFR BLD: 5.5 % (ref 4–5.6)
HCT VFR BLD AUTO: 35.2 % (ref 35–47)
HGB BLD-MCNC: 11.1 G/DL (ref 11.5–16)
MCH RBC QN AUTO: 26.1 PG (ref 26–34)
MCHC RBC AUTO-ENTMCNC: 31.5 G/DL (ref 30–36.5)
MCV RBC AUTO: 82.8 FL (ref 80–99)
NRBC # BLD: 0 K/UL (ref 0–0.01)
NRBC BLD-RTO: 0 PER 100 WBC
PLATELET # BLD AUTO: 222 K/UL (ref 150–400)
PMV BLD AUTO: 11.4 FL (ref 8.9–12.9)
POTASSIUM SERPL-SCNC: 3.7 MMOL/L (ref 3.5–5.1)
PROT SERPL-MCNC: 6.7 G/DL (ref 6.4–8.2)
RBC # BLD AUTO: 4.25 M/UL (ref 3.8–5.2)
SODIUM SERPL-SCNC: 141 MMOL/L (ref 136–145)
WBC # BLD AUTO: 4.2 K/UL (ref 3.6–11)

## 2023-08-10 NOTE — ROUTINE PROCESS
TRANSFER - OUT REPORT:    Verbal report given to Naveen Ryan RN(name) on [address]  being transferred to 730(unit) for routine progression of care       Report consisted of patients Situation, Background, Assessment and   Recommendations(SBAR). Information from the following report(s) SBAR, Kardex, ED Summary, [facility] and Recent Results was reviewed with the receiving nurse. Lines:       Opportunity for questions and clarification was provided.       Patient transported with:   Tech and HPD
Congruent
Congruent

## 2023-11-02 ENCOUNTER — TELEPHONE (OUTPATIENT)
Dept: PRIMARY CARE CLINIC | Facility: CLINIC | Age: 28
End: 2023-11-02

## 2023-11-06 ENCOUNTER — TELEPHONE (OUTPATIENT)
Dept: PRIMARY CARE CLINIC | Facility: CLINIC | Age: 28
End: 2023-11-06

## 2023-11-06 NOTE — TELEPHONE ENCOUNTER
----- Message from Sheridan Lima sent at 11/6/2023  1:39 PM EST -----  Subject: Medication Problem    Medication: guanFACINE (INTUNIV) 1 MG TB24 extended release tablet  Dosage: 1mg once nightly  Ordering Provider:     Question/Problem: Patient is scheduled for an ED f/u on 11/21/23. She went   to [facility] ED on 10/27 for severe diarrhea and stomach cramping. Patient   wanted to make sure that the PCP knew that her Mental Health Provider   (Alex Sterling) asked her to stop taking this medication \"cold turkey\". The   patient stopped taking this medication on 10/24 and found herself in the   ED on 10/27 and believes the two to be related. ED Rx'd Dicyclomine and   Imodium which seem to be helping. Is there any way she can be seen sooner   than 11/21 for her ED f/u?       Pharmacy: [id number] [address] [facility], [address] [place] [address] [phone] Sky Salamanca [phone]    ---------------------------------------------------------------------------  --------------  Bonny ROMERO  [phone]; OK to leave message on voicemail,OK to respond with electronic   message via [facility] portal (only for patients who have registered [facility]   account)  ---------------------------------------------------------------------------  --------------    SCRIPT ANSWERS  Relationship to Patient: Self

## 2023-11-08 NOTE — TELEPHONE ENCOUNTER
Called patient Per Dr. Boyce Pod - She can call and see if we have any cancellations sooner but I cannot double book her at this time. If she feels she cannot wait can go to urgent care. Patient verbalized understanding.      Also advised patient to bring in ED paperwork if possible

## 2023-11-21 ENCOUNTER — OFFICE VISIT (OUTPATIENT)
Dept: PRIMARY CARE CLINIC | Facility: CLINIC | Age: 28
End: 2023-11-21
Payer: COMMERCIAL

## 2023-11-21 VITALS
HEART RATE: 74 BPM | HEIGHT: 61 IN | DIASTOLIC BLOOD PRESSURE: 86 MMHG | WEIGHT: 171 LBS | BODY MASS INDEX: 32.28 KG/M2 | OXYGEN SATURATION: 96 % | SYSTOLIC BLOOD PRESSURE: 134 MMHG | RESPIRATION RATE: 17 BRPM

## 2023-11-21 DIAGNOSIS — R10.84 GENERALIZED ABDOMINAL PAIN: ICD-10-CM

## 2023-11-21 DIAGNOSIS — R19.8 ALTERNATING CONSTIPATION AND DIARRHEA: Primary | ICD-10-CM

## 2023-11-21 DIAGNOSIS — R93.89 ABNORMAL CT OF THE CHEST: ICD-10-CM

## 2023-11-21 DIAGNOSIS — R19.8 ALTERNATING CONSTIPATION AND DIARRHEA: ICD-10-CM

## 2023-11-21 DIAGNOSIS — D64.9 ANEMIA, UNSPECIFIED TYPE: ICD-10-CM

## 2023-11-21 LAB
ALBUMIN SERPL-MCNC: 3.8 G/DL (ref 3.5–5)
ALBUMIN/GLOB SERPL: 1 (ref 1.1–2.2)
ALP SERPL-CCNC: 74 U/L (ref 45–117)
ALT SERPL-CCNC: 22 U/L (ref 12–78)
ANION GAP SERPL CALC-SCNC: 8 MMOL/L (ref 5–15)
AST SERPL-CCNC: 13 U/L (ref 15–37)
BASOPHILS # BLD: 0.1 K/UL (ref 0–0.1)
BASOPHILS NFR BLD: 1 % (ref 0–1)
BILIRUB SERPL-MCNC: 0.2 MG/DL (ref 0.2–1)
BUN SERPL-MCNC: 13 MG/DL (ref 6–20)
BUN/CREAT SERPL: 18 (ref 12–20)
CALCIUM SERPL-MCNC: 9.6 MG/DL (ref 8.5–10.1)
CHLORIDE SERPL-SCNC: 113 MMOL/L (ref 97–108)
CO2 SERPL-SCNC: 21 MMOL/L (ref 21–32)
CREAT SERPL-MCNC: 0.73 MG/DL (ref 0.55–1.02)
DIFFERENTIAL METHOD BLD: NORMAL
EOSINOPHIL # BLD: 0.3 K/UL (ref 0–0.4)
EOSINOPHIL NFR BLD: 6 % (ref 0–7)
ERYTHROCYTE [DISTWIDTH] IN BLOOD BY AUTOMATED COUNT: 13.7 % (ref 11.5–14.5)
FOLATE SERPL-MCNC: 16.8 NG/ML (ref 5–21)
GLOBULIN SER CALC-MCNC: 3.7 G/DL (ref 2–4)
GLUCOSE SERPL-MCNC: 101 MG/DL (ref 65–100)
HCT VFR BLD AUTO: 37.2 % (ref 35–47)
HGB BLD-MCNC: 12.1 G/DL (ref 11.5–16)
IMM GRANULOCYTES # BLD AUTO: 0 K/UL (ref 0–0.04)
IMM GRANULOCYTES NFR BLD AUTO: 0 % (ref 0–0.5)
LIPASE SERPL-CCNC: 79 U/L (ref 13–75)
LYMPHOCYTES # BLD: 2.3 K/UL (ref 0.8–3.5)
LYMPHOCYTES NFR BLD: 48 % (ref 12–49)
MCH RBC QN AUTO: 26.2 PG (ref 26–34)
MCHC RBC AUTO-ENTMCNC: 32.5 G/DL (ref 30–36.5)
MCV RBC AUTO: 80.7 FL (ref 80–99)
MONOCYTES # BLD: 0.3 K/UL (ref 0–1)
MONOCYTES NFR BLD: 6 % (ref 5–13)
NEUTS SEG # BLD: 1.9 K/UL (ref 1.8–8)
NEUTS SEG NFR BLD: 39 % (ref 32–75)
NRBC # BLD: 0 K/UL (ref 0–0.01)
NRBC BLD-RTO: 0 PER 100 WBC
PLATELET # BLD AUTO: 252 K/UL (ref 150–400)
PMV BLD AUTO: 11.7 FL (ref 8.9–12.9)
POTASSIUM SERPL-SCNC: 3.7 MMOL/L (ref 3.5–5.1)
PROT SERPL-MCNC: 7.5 G/DL (ref 6.4–8.2)
RBC # BLD AUTO: 4.61 M/UL (ref 3.8–5.2)
SODIUM SERPL-SCNC: 142 MMOL/L (ref 136–145)
VIT B12 SERPL-MCNC: 366 PG/ML (ref 193–986)
WBC # BLD AUTO: 4.9 K/UL (ref 3.6–11)

## 2023-11-21 PROCEDURE — 99214 OFFICE O/P EST MOD 30 MIN: CPT | Performed by: FAMILY MEDICINE

## 2023-11-21 ASSESSMENT — ENCOUNTER SYMPTOMS
VOMITING: 0
NAUSEA: 0

## 2023-11-25 LAB
ENDOMYSIUM IGA SER QL: NEGATIVE
IGA SERPL-MCNC: 252 MG/DL (ref 87–352)
TTG IGA SER-ACNC: 2 U/ML (ref 0–3)

## 2023-11-26 LAB
BACTERIA SPEC CULT: NORMAL
CAMPYLOBACTER STL CULT: NORMAL
E COLI SXT STL QL IA: NEGATIVE
SALMONELLA/SHIGELLA SCREEN: NORMAL
SPECIMEN SOURCE: NORMAL

## 2023-11-29 ENCOUNTER — TELEPHONE (OUTPATIENT)
Dept: PRIMARY CARE CLINIC | Facility: CLINIC | Age: 28
End: 2023-11-29

## 2023-11-29 NOTE — TELEPHONE ENCOUNTER
Will Payne w/ [employer] was stating that the patient had a CT Chest W WO Contrast but had it done somewhere else where they cannot access the imaging. Please contact Will Payne at KD#[phone].

## 2023-11-30 ENCOUNTER — TELEPHONE (OUTPATIENT)
Dept: PRIMARY CARE CLINIC | Facility: CLINIC | Age: 28
End: 2023-11-30

## 2023-11-30 DIAGNOSIS — R93.89 ABNORMAL CHEST CT: Primary | ICD-10-CM

## 2023-11-30 NOTE — TELEPHONE ENCOUNTER
Patient is scheduled for a CT of her chest tomorrow and the insurance will not approve \"with or without\" contrast.  They are suggesting it be \"with\" contrast.  Can Dr Abrams please resubmit the order?

## 2023-12-01 ENCOUNTER — TELEPHONE (OUTPATIENT)
Dept: PRIMARY CARE CLINIC | Facility: CLINIC | Age: 28
End: 2023-12-01

## 2023-12-01 NOTE — TELEPHONE ENCOUNTER
Jody From [employer] said Dr. Abrams put in an order for a CT of the chest. Jody said when she clicks on the request it's saying Finalize request instead of Active request. Jody said she thinks the doctor needs to sign the request to make it active.   [phone] - scheduling dept

## 2024-03-07 ENCOUNTER — TELEMEDICINE (OUTPATIENT)
Dept: PRIMARY CARE CLINIC | Facility: CLINIC | Age: 29
End: 2024-03-07
Payer: COMMERCIAL

## 2024-03-07 DIAGNOSIS — G89.29 OTHER CHRONIC BACK PAIN: ICD-10-CM

## 2024-03-07 DIAGNOSIS — M54.9 OTHER CHRONIC BACK PAIN: ICD-10-CM

## 2024-03-07 DIAGNOSIS — M32.9 PERSONAL HISTORY OF SYSTEMIC LUPUS ERYTHEMATOSUS (SLE) (HCC): Primary | ICD-10-CM

## 2024-03-07 PROCEDURE — 99213 OFFICE O/P EST LOW 20 MIN: CPT | Performed by: FAMILY MEDICINE

## 2024-03-07 RX ORDER — NORETHINDRONE ACETATE AND ETHINYL ESTRADIOL AND FERROUS FUMARATE 1MG-20(21)
1 KIT ORAL DAILY
COMMUNITY
Start: 2022-11-25

## 2024-03-07 ASSESSMENT — PATIENT HEALTH QUESTIONNAIRE - PHQ9
SUM OF ALL RESPONSES TO PHQ9 QUESTIONS 1 & 2: 1
SUM OF ALL RESPONSES TO PHQ QUESTIONS 1-9: 1
1. LITTLE INTEREST OR PLEASURE IN DOING THINGS: 0
SUM OF ALL RESPONSES TO PHQ QUESTIONS 1-9: 1
SUM OF ALL RESPONSES TO PHQ QUESTIONS 1-9: 1
2. FEELING DOWN, DEPRESSED OR HOPELESS: 1
SUM OF ALL RESPONSES TO PHQ QUESTIONS 1-9: 1

## 2024-03-07 NOTE — PROGRESS NOTES
\"Have you been to the ER, urgent care clinic since your last visit?  Hospitalized since your last visit?\"    NO    “Have you seen or consulted any other health care providers outside of [facility] since your last visit?”    NO         
effort normal   [x] No visualized signs of difficulty breathing or respiratory distress        [] Abnormal -       Neurological:        [x] No Facial Asymmetry (Cranial nerve 7 motor function) (limited exam due to video visit)          [x] No gaze palsy        [] Abnormal -          Skin:        [x] No significant exanthematous lesions or discoloration noted on facial skin         [] Abnormal -            Psychiatric:       [x] Normal Affect [] Abnormal -        [x] No Hallucinations      --Sonya Collier DO

## 2024-03-10 ENCOUNTER — PATIENT MESSAGE (OUTPATIENT)
Dept: PRIMARY CARE CLINIC | Facility: CLINIC | Age: 29
End: 2024-03-10

## 2024-03-10 DIAGNOSIS — M54.9 OTHER CHRONIC BACK PAIN: Primary | ICD-10-CM

## 2024-03-10 DIAGNOSIS — G89.29 OTHER CHRONIC BACK PAIN: Primary | ICD-10-CM

## 2024-03-18 ENCOUNTER — HOSPITAL ENCOUNTER (OUTPATIENT)
Age: 29
Discharge: HOME | End: 2024-03-21
Payer: COMMERCIAL

## 2024-03-18 DIAGNOSIS — M54.6 THORACIC SPINE PAIN: ICD-10-CM

## 2024-03-18 PROCEDURE — 72070 X-RAY EXAM THORAC SPINE 2VWS: CPT

## 2024-04-22 ENCOUNTER — HOSPITAL ENCOUNTER (OUTPATIENT)
Age: 29
Discharge: HOME | End: 2024-04-25
Payer: COMMERCIAL

## 2024-04-22 DIAGNOSIS — R93.89 ABNORMAL CHEST CT: ICD-10-CM

## 2024-04-22 PROCEDURE — 71260 CT THORAX DX C+: CPT

## 2024-04-22 PROCEDURE — 6360000004 HC RX CONTRAST MEDICATION: Performed by: FAMILY MEDICINE

## 2024-04-22 RX ADMIN — IOPAMIDOL 100 ML: 755 INJECTION, SOLUTION INTRAVENOUS at 14:01

## 2024-04-23 DIAGNOSIS — R93.89 ABNORMAL CHEST CT: Primary | ICD-10-CM

## 2024-06-06 ENCOUNTER — HOSPITAL ENCOUNTER (OUTPATIENT)
Facility: HOSPITAL | Age: 29
Discharge: HOME | End: 2024-06-06
Payer: COMMERCIAL

## 2024-06-06 DIAGNOSIS — R93.3 ABNORMAL CT SCAN, ESOPHAGUS: ICD-10-CM

## 2024-06-06 PROCEDURE — 74220 X-RAY XM ESOPHAGUS 1CNTRST: CPT
